# Patient Record
Sex: MALE | Race: WHITE | NOT HISPANIC OR LATINO | Employment: OTHER | URBAN - METROPOLITAN AREA
[De-identification: names, ages, dates, MRNs, and addresses within clinical notes are randomized per-mention and may not be internally consistent; named-entity substitution may affect disease eponyms.]

---

## 2018-04-12 ENCOUNTER — HOSPITAL ENCOUNTER (OUTPATIENT)
Dept: PHYSICAL THERAPY | Facility: HOSPITAL | Age: 68
Setting detail: RECURRING SERIES
Discharge: HOME | End: 2018-05-17
Attending: ORTHOPAEDIC SURGERY | Admitting: ORTHOPAEDIC SURGERY

## 2018-07-23 ENCOUNTER — ON CAMPUS - OUTPATIENT (AMBULATORY)
Dept: URBAN - METROPOLITAN AREA HOSPITAL 2 | Facility: HOSPITAL | Age: 68
End: 2018-07-23
Payer: MEDICARE

## 2018-07-23 VITALS
HEART RATE: 57 BPM | HEIGHT: 70 IN | DIASTOLIC BLOOD PRESSURE: 83 MMHG | OXYGEN SATURATION: 99 % | HEART RATE: 61 BPM | OXYGEN SATURATION: 100 % | HEART RATE: 67 BPM | DIASTOLIC BLOOD PRESSURE: 63 MMHG | HEART RATE: 68 BPM | SYSTOLIC BLOOD PRESSURE: 117 MMHG | RESPIRATION RATE: 20 BRPM | SYSTOLIC BLOOD PRESSURE: 124 MMHG | WEIGHT: 192 LBS | HEART RATE: 64 BPM | RESPIRATION RATE: 17 BRPM | HEART RATE: 62 BPM | TEMPERATURE: 98.1 F | SYSTOLIC BLOOD PRESSURE: 112 MMHG | SYSTOLIC BLOOD PRESSURE: 137 MMHG | RESPIRATION RATE: 15 BRPM | DIASTOLIC BLOOD PRESSURE: 82 MMHG | RESPIRATION RATE: 16 BRPM | DIASTOLIC BLOOD PRESSURE: 77 MMHG | DIASTOLIC BLOOD PRESSURE: 54 MMHG | OXYGEN SATURATION: 97 % | SYSTOLIC BLOOD PRESSURE: 120 MMHG | SYSTOLIC BLOOD PRESSURE: 132 MMHG | DIASTOLIC BLOOD PRESSURE: 80 MMHG | SYSTOLIC BLOOD PRESSURE: 123 MMHG | HEART RATE: 58 BPM

## 2018-07-23 DIAGNOSIS — K64.8 OTHER HEMORRHOIDS: ICD-10-CM

## 2018-07-23 DIAGNOSIS — K57.30 DIVERTICULOSIS OF LARGE INTESTINE WITHOUT PERFORATION OR ABS: ICD-10-CM

## 2018-07-23 DIAGNOSIS — Z12.11 ENCOUNTER FOR SCREENING FOR MALIGNANT NEOPLASM OF COLON: ICD-10-CM

## 2018-07-23 PROCEDURE — G0121 COLON CA SCRN NOT HI RSK IND: HCPCS | Performed by: INTERNAL MEDICINE

## 2018-07-23 RX ADMIN — PROPOFOL: 10 INJECTION, EMULSION INTRAVENOUS at 08:06

## 2019-10-21 ENCOUNTER — OFFICE VISIT (OUTPATIENT)
Dept: FAMILY MEDICINE CLINIC | Facility: CLINIC | Age: 69
End: 2019-10-21

## 2019-10-21 VITALS
WEIGHT: 191.8 LBS | HEIGHT: 71 IN | BODY MASS INDEX: 26.85 KG/M2 | HEART RATE: 65 BPM | OXYGEN SATURATION: 96 % | DIASTOLIC BLOOD PRESSURE: 80 MMHG | TEMPERATURE: 97.7 F | SYSTOLIC BLOOD PRESSURE: 133 MMHG

## 2019-10-21 DIAGNOSIS — S32.402D CLOSED NONDISPLACED FRACTURE OF LEFT ACETABULUM WITH ROUTINE HEALING, UNSPECIFIED PORTION OF ACETABULUM, SUBSEQUENT ENCOUNTER: ICD-10-CM

## 2019-10-21 DIAGNOSIS — M25.552 HIP JOINT PAINFUL ON MOVEMENT, LEFT: ICD-10-CM

## 2019-10-21 DIAGNOSIS — Z23 NEED FOR IMMUNIZATION AGAINST INFLUENZA: Primary | ICD-10-CM

## 2019-10-21 DIAGNOSIS — M25.552 HIP JOINT PAINFUL ON MOVEMENT, LEFT: Primary | ICD-10-CM

## 2019-10-21 PROBLEM — I10 ESSENTIAL HYPERTENSION: Status: ACTIVE | Noted: 2019-10-21

## 2019-10-21 PROCEDURE — 99213 OFFICE O/P EST LOW 20 MIN: CPT | Performed by: FAMILY MEDICINE

## 2019-10-21 PROCEDURE — 90653 IIV ADJUVANT VACCINE IM: CPT | Performed by: FAMILY MEDICINE

## 2019-10-21 PROCEDURE — G0008 ADMIN INFLUENZA VIRUS VAC: HCPCS | Performed by: FAMILY MEDICINE

## 2019-10-21 RX ORDER — LISINOPRIL 20 MG/1
20 TABLET ORAL DAILY
Refills: 3 | COMMUNITY
Start: 2019-10-09 | End: 2020-01-02 | Stop reason: SDUPTHER

## 2019-10-21 RX ORDER — NAPROXEN 500 MG/1
500 TABLET ORAL 2 TIMES DAILY WITH MEALS
Refills: 0 | COMMUNITY
Start: 2019-09-13 | End: 2019-11-04 | Stop reason: SDUPTHER

## 2019-10-21 NOTE — PROGRESS NOTES
Subjective   Alfonso Bull is a 69 y.o. male.   Chief Complaint   Patient presents with   • Pain       History of Present Illness   Presents to the office today with an injury to his left hip.  About 8 days ago, he stepped down into a trench that they were digging.  He felt a sudden pain in his left groin.  For about 4- 5 days he had pain on the inner aspect of his left hip that was worse with walking, and with position changes.  The pain will occasionally radiate from his left groin down to his left knee.  The pain improved dramatically with sitting.  About 5 days ago it went away for about 24 hours, then the pain recurred over the weekend.  It became so bad he was limping.  He did not hear a pop or feel a tearing pain when he stepped in the hole.  He has a known hernia, which sounds like an inguinal hernia.  He had a CAT scan done about 7 years ago at a freestanding radiology department.  No record of that is available.      Patient Active Problem List    Diagnosis Date Noted   • Essential hypertension 10/21/2019   • Hip joint painful on movement, left 10/21/2019           Past Surgical History:   Procedure Laterality Date   • COLONOSCOPY  03/01/2018   • ENDOSCOPY  01/01/2011   • TONSILLECTOMY       Current Outpatient Medications on File Prior to Visit   Medication Sig   • lisinopril (PRINIVIL,ZESTRIL) 20 MG tablet Take 20 mg by mouth Daily.   • naproxen (NAPROSYN) 500 MG tablet Take 500 mg by mouth 2 (Two) Times a Day With Meals.     No current facility-administered medications on file prior to visit.      No Known Allergies  Social History     Socioeconomic History   • Marital status:      Spouse name: Not on file   • Number of children: Not on file   • Years of education: Not on file   • Highest education level: Not on file   Tobacco Use   • Smoking status: Never Smoker   • Smokeless tobacco: Never Used     Family History   Problem Relation Age of Onset   • No Known Problems Mother    • No Known  "Problems Father    • Aneurysm Sister    • No Known Problems Brother    • No Known Problems Son    • No Known Problems Sister    • No Known Problems Son      The following portions of the patient's history were reviewed and updated as appropriate: allergies, current medications, past family history, past medical history, past social history, past surgical history and problem list.    Review of Systems   Constitutional: Negative for chills and fever.   Respiratory: Negative for shortness of breath.    Cardiovascular: Negative for chest pain.   Gastrointestinal: Negative for abdominal pain.       Objective   /80 (BP Location: Right arm, Patient Position: Sitting, Cuff Size: Adult)   Pulse 65   Temp 97.7 °F (36.5 °C) (Oral)   Ht 180.3 cm (71\")   Wt 87 kg (191 lb 12.8 oz)   SpO2 96%   BMI 26.75 kg/m²   Physical Exam   Constitutional: He is oriented to person, place, and time. He appears well-developed and well-nourished.   HENT:   Head: Normocephalic and atraumatic.   Eyes: Conjunctivae and EOM are normal.   Cardiovascular: Normal rate.   Pulmonary/Chest: Effort normal.   Abdominal: Hernia confirmed negative in the right inguinal area and confirmed negative in the left inguinal area.   Musculoskeletal: Normal range of motion. He exhibits no edema.   Sudden sharp pain to passive internal rotation of the left hip.  No tenderness palpation across the lower lumbar spine.  Flip test is equivocal, to slightly positive on the left.   Lymphadenopathy: No inguinal adenopathy noted on the right or left side.   Neurological: He is alert and oriented to person, place, and time.         No visits with results within 4 Month(s) from this visit.   Latest known visit with results is:   No results found for any previous visit.         Assessment/Plan   Diagnoses and all orders for this visit:    1. Need for immunization against influenza (Primary)  -     Fluad Quad >65 years    2. Hip joint painful on movement, left  -     XR " Hip With or Without Pelvis 2 - 3 View Left  -     XR Spine Lumbar 4+ View (In Office)    The differential diagnosis includes a possible fracture of the hip joint, pubic ramus, possibly a compression fracture of the lumbar spine with radiated pain.  Possibly a tendinous, or ligamentous injury, and much less likely is a tear to his hernia.  We will start with an x-ray as above.  We will treat with anti-inflammatories for another week to week and a half.  If he continues to have pain, he will let me know and we will order an MRI of his hip.  We will follow-up with the results of the x-rays are available.             Return if symptoms worsen or fail to improve.

## 2019-10-22 ENCOUNTER — TELEPHONE (OUTPATIENT)
Dept: FAMILY MEDICINE CLINIC | Facility: CLINIC | Age: 69
End: 2019-10-22

## 2019-10-22 DIAGNOSIS — S32.402D CLOSED NONDISPLACED FRACTURE OF LEFT ACETABULUM WITH ROUTINE HEALING, UNSPECIFIED PORTION OF ACETABULUM, SUBSEQUENT ENCOUNTER: Primary | ICD-10-CM

## 2019-10-22 DIAGNOSIS — M25.552 HIP JOINT PAINFUL ON MOVEMENT, LEFT: ICD-10-CM

## 2019-10-22 PROBLEM — S32.425D: Status: ACTIVE | Noted: 2019-10-22

## 2019-10-22 RX ORDER — HYDROCODONE BITARTRATE AND ACETAMINOPHEN 5; 325 MG/1; MG/1
1 TABLET ORAL EVERY 4 HOURS PRN
Qty: 24 TABLET | Refills: 0 | Status: SHIPPED | OUTPATIENT
Start: 2019-10-22 | End: 2020-10-26

## 2019-10-22 NOTE — TELEPHONE ENCOUNTER
Pt calling wanting to know if he can have something sent in for pain to [facility] pharmacy if appropriate for his hip fracture. Thanks

## 2019-11-04 RX ORDER — NAPROXEN 500 MG/1
500 TABLET ORAL 2 TIMES DAILY WITH MEALS
Qty: 180 TABLET | Refills: 1 | Status: SHIPPED | OUTPATIENT
Start: 2019-11-04 | End: 2020-01-03 | Stop reason: SDUPTHER

## 2019-11-04 NOTE — TELEPHONE ENCOUNTER
Pt would like a 90 day supply of his naproxen sent to [facility] please. Last ov 10/21/19

## 2020-01-02 RX ORDER — LISINOPRIL 20 MG/1
20 TABLET ORAL DAILY
Qty: 90 TABLET | Refills: 3 | Status: SHIPPED | OUTPATIENT
Start: 2020-01-02 | End: 2020-03-24 | Stop reason: DRUGHIGH

## 2020-01-03 DIAGNOSIS — S32.425D CLOSED NONDISPLACED FRACTURE OF POSTERIOR WALL OF LEFT ACETABULUM WITH ROUTINE HEALING: Primary | ICD-10-CM

## 2020-01-03 RX ORDER — NAPROXEN 500 MG/1
500 TABLET ORAL 2 TIMES DAILY WITH MEALS
Qty: 180 TABLET | Refills: 1 | Status: SHIPPED | OUTPATIENT
Start: 2020-01-03 | End: 2020-06-12

## 2020-01-03 NOTE — TELEPHONE ENCOUNTER
Patient is needing a refill of his Naproxen to [facility]. Medication is loaded and ready to send. Thanks.

## 2020-01-31 DIAGNOSIS — S32.425D CLOSED NONDISPLACED FRACTURE OF POSTERIOR WALL OF LEFT ACETABULUM WITH ROUTINE HEALING: ICD-10-CM

## 2020-01-31 RX ORDER — NAPROXEN 500 MG/1
500 TABLET ORAL 2 TIMES DAILY WITH MEALS
Qty: 180 TABLET | Refills: 1 | OUTPATIENT
Start: 2020-01-31

## 2020-03-07 DIAGNOSIS — S32.425D CLOSED NONDISPLACED FRACTURE OF POSTERIOR WALL OF LEFT ACETABULUM WITH ROUTINE HEALING: ICD-10-CM

## 2020-03-08 RX ORDER — NAPROXEN 500 MG/1
500 TABLET ORAL 2 TIMES DAILY WITH MEALS
Qty: 180 TABLET | Refills: 1 | OUTPATIENT
Start: 2020-03-08

## 2020-03-12 ENCOUNTER — CLINICAL SUPPORT (OUTPATIENT)
Dept: FAMILY MEDICINE CLINIC | Facility: CLINIC | Age: 70
End: 2020-03-12

## 2020-03-12 VITALS — HEART RATE: 60 BPM | DIASTOLIC BLOOD PRESSURE: 85 MMHG | OXYGEN SATURATION: 98 % | SYSTOLIC BLOOD PRESSURE: 159 MMHG

## 2020-03-12 NOTE — PROGRESS NOTES
"Patient came in for a blood pressure check.  Patient took his bp medication this morning around 0730.   Pt states he took his bp at home last night and it was 167/87 at home and it was running around that this morning around 7 before he took his medication.  Pt states that off and on x a year he occasionally has episodes that he feels \"weird\" or has a light feeling and wonder if it isn't because his blood pressure is off.    "

## 2020-03-13 NOTE — PROGRESS NOTES
Please ask Alfonso to begin checking his blood pressure once daily at home.  If his systolic blood pressure remains above 140, let me know.  Thanks.

## 2020-03-24 ENCOUNTER — TELEPHONE (OUTPATIENT)
Dept: FAMILY MEDICINE CLINIC | Facility: CLINIC | Age: 70
End: 2020-03-24

## 2020-03-24 DIAGNOSIS — I10 ESSENTIAL HYPERTENSION: Primary | ICD-10-CM

## 2020-03-24 RX ORDER — LISINOPRIL 30 MG/1
1 TABLET ORAL DAILY
COMMUNITY
End: 2020-03-24 | Stop reason: SDUPTHER

## 2020-03-24 RX ORDER — LISINOPRIL 30 MG/1
30 TABLET ORAL DAILY
Qty: 90 TABLET | Refills: 1 | Status: SHIPPED | OUTPATIENT
Start: 2020-03-24 | End: 2020-09-21 | Stop reason: SDUPTHER

## 2020-03-24 NOTE — TELEPHONE ENCOUNTER
----- Message from Winter Hall MD sent at 3/23/2020 12:55 PM EDT -----      ----- Message -----  From: Tracy Bull  Sent: 3/23/2020   9:24 AM EDT  To: Winter Hall MD

## 2020-04-06 ENCOUNTER — TELEPHONE (OUTPATIENT)
Dept: FAMILY MEDICINE CLINIC | Facility: CLINIC | Age: 70
End: 2020-04-06

## 2020-04-06 DIAGNOSIS — I10 ESSENTIAL HYPERTENSION: Primary | ICD-10-CM

## 2020-04-06 RX ORDER — AMLODIPINE BESYLATE 10 MG/1
10 TABLET ORAL DAILY
Qty: 30 TABLET | Refills: 3 | Status: SHIPPED | OUTPATIENT
Start: 2020-04-06 | End: 2020-10-26 | Stop reason: SDUPTHER

## 2020-04-06 NOTE — TELEPHONE ENCOUNTER
That is still a little too high.  I would like to add amlodipine to his daily blood pressure regimen.  [facility]  Keep checking his blood pressure as he has been doing and let us know how his blood pressure looks in about a week.  If he would like, we could do a video or telephone visit at that time.  Thanks

## 2020-04-06 NOTE — TELEPHONE ENCOUNTER
Pts bp medication was recently increased to lisinopril 30 on march 24. Pt has been taking bp and concerned it is still running high.   3-29 142/74   7pm   3-30   157/72    7pm  3/31 145/78   7:22pm  4/1    143/78    9pm  4/2    137/78    8pm  4/4   150/80   620pm    I checked Alfonso's bp machine along with a manual cuff on Saturday. Pts bp machine on 4/4 was 157/84 and with my manual it was 150/80

## 2020-06-12 DIAGNOSIS — S32.425D CLOSED NONDISPLACED FRACTURE OF POSTERIOR WALL OF LEFT ACETABULUM WITH ROUTINE HEALING: ICD-10-CM

## 2020-06-12 RX ORDER — NAPROXEN 500 MG/1
500 TABLET ORAL 2 TIMES DAILY WITH MEALS
Qty: 180 TABLET | Refills: 1 | Status: SHIPPED | OUTPATIENT
Start: 2020-06-12 | End: 2020-10-15 | Stop reason: SDUPTHER

## 2020-09-21 DIAGNOSIS — I10 ESSENTIAL HYPERTENSION: ICD-10-CM

## 2020-09-21 RX ORDER — LISINOPRIL 30 MG/1
30 TABLET ORAL DAILY
Qty: 90 TABLET | Refills: 1 | Status: SHIPPED | OUTPATIENT
Start: 2020-09-21 | End: 2020-10-26 | Stop reason: SDUPTHER

## 2020-09-21 NOTE — TELEPHONE ENCOUNTER
Please let Alfonso know that I got a refill request for his blood pressure medication.  I have refilled that.  I see that our follow-up appointment in the spring had to be canceled because of the pandemic.  Please ask him to schedule a follow-up on his blood pressure within the next couple of months.  Thanks

## 2020-10-15 DIAGNOSIS — S32.425D CLOSED NONDISPLACED FRACTURE OF POSTERIOR WALL OF LEFT ACETABULUM WITH ROUTINE HEALING: ICD-10-CM

## 2020-10-16 RX ORDER — NAPROXEN 500 MG/1
500 TABLET ORAL 2 TIMES DAILY WITH MEALS
Qty: 180 TABLET | Refills: 1 | Status: SHIPPED | OUTPATIENT
Start: 2020-10-16 | End: 2020-10-26 | Stop reason: SDUPTHER

## 2020-10-26 ENCOUNTER — OFFICE VISIT (OUTPATIENT)
Dept: FAMILY MEDICINE CLINIC | Facility: CLINIC | Age: 70
End: 2020-10-26

## 2020-10-26 VITALS
SYSTOLIC BLOOD PRESSURE: 121 MMHG | TEMPERATURE: 97 F | DIASTOLIC BLOOD PRESSURE: 73 MMHG | BODY MASS INDEX: 27.54 KG/M2 | HEIGHT: 70 IN | WEIGHT: 192.4 LBS | OXYGEN SATURATION: 97 % | HEART RATE: 56 BPM

## 2020-10-26 DIAGNOSIS — S32.425D CLOSED NONDISPLACED FRACTURE OF POSTERIOR WALL OF LEFT ACETABULUM WITH ROUTINE HEALING: ICD-10-CM

## 2020-10-26 DIAGNOSIS — Z11.59 NEED FOR HEPATITIS C SCREENING TEST: ICD-10-CM

## 2020-10-26 DIAGNOSIS — Z23 NEED FOR INFLUENZA VACCINATION: ICD-10-CM

## 2020-10-26 DIAGNOSIS — Z12.5 ENCOUNTER FOR PROSTATE CANCER SCREENING: ICD-10-CM

## 2020-10-26 DIAGNOSIS — Z23 NEED FOR 23-POLYVALENT PNEUMOCOCCAL POLYSACCHARIDE VACCINE: ICD-10-CM

## 2020-10-26 DIAGNOSIS — Z13.220 SCREENING FOR CHOLESTEROL LEVEL: ICD-10-CM

## 2020-10-26 DIAGNOSIS — I10 ESSENTIAL HYPERTENSION: Primary | ICD-10-CM

## 2020-10-26 DIAGNOSIS — Z23 FLU VACCINE NEED: ICD-10-CM

## 2020-10-26 PROCEDURE — 99214 OFFICE O/P EST MOD 30 MIN: CPT | Performed by: FAMILY MEDICINE

## 2020-10-26 PROCEDURE — 90732 PPSV23 VACC 2 YRS+ SUBQ/IM: CPT | Performed by: FAMILY MEDICINE

## 2020-10-26 PROCEDURE — G0008 ADMIN INFLUENZA VIRUS VAC: HCPCS | Performed by: FAMILY MEDICINE

## 2020-10-26 PROCEDURE — G0009 ADMIN PNEUMOCOCCAL VACCINE: HCPCS | Performed by: FAMILY MEDICINE

## 2020-10-26 PROCEDURE — 90694 VACC AIIV4 NO PRSRV 0.5ML IM: CPT | Performed by: FAMILY MEDICINE

## 2020-10-26 RX ORDER — NAPROXEN 500 MG/1
500 TABLET ORAL 2 TIMES DAILY WITH MEALS
Qty: 180 TABLET | Refills: 3 | Status: SHIPPED | OUTPATIENT
Start: 2020-10-26 | End: 2021-11-29

## 2020-10-26 RX ORDER — AMLODIPINE BESYLATE 10 MG/1
10 TABLET ORAL DAILY
Qty: 90 TABLET | Refills: 3 | Status: SHIPPED | OUTPATIENT
Start: 2020-10-26 | End: 2022-03-16 | Stop reason: SDUPTHER

## 2020-10-26 RX ORDER — LISINOPRIL 30 MG/1
30 TABLET ORAL DAILY
Qty: 90 TABLET | Refills: 3 | Status: SHIPPED | OUTPATIENT
Start: 2020-10-26 | End: 2021-09-13

## 2020-10-26 NOTE — PROGRESS NOTES
Subjective   Alfonso Bull is a 70 y.o. male.   Chief Complaint   Patient presents with   • Hypertension       History of Present Illness   Presents to the office today to follow-up on hypertension.  Continues on amlodipine and Prinivil.  No side effects of medications.  Blood pressure control remains good.  He is interested in a Pneumovax and influenza vaccine today.  Last labs according to the chart was 2012.  Last fall, he had a fall with injury of his left hip.  There was suspected acetabular fracture - this was not confirmed on MRI.  No longer has any hip pain.  The orthopedic specialist felt that a lot of the pain may have been coming from his back.  His back is not hurting him now, either.      Patient Active Problem List    Diagnosis Date Noted   • Closed nondisplaced fracture of posterior wall of left acetabulum with routine healing 10/22/2019     Note Last Updated: 10/22/2019     DX on 10/21/19     • Essential hypertension 10/21/2019           Past Surgical History:   Procedure Laterality Date   • COLONOSCOPY  03/01/2018   • ENDOSCOPY  01/01/2011   • TONSILLECTOMY       Current Outpatient Medications on File Prior to Visit   Medication Sig   • [DISCONTINUED] amLODIPine (NORVASC) 10 MG tablet Take 1 tablet by mouth Daily.   • [DISCONTINUED] lisinopril (PRINIVIL,ZESTRIL) 30 MG tablet Take 1 tablet by mouth Daily.   • [DISCONTINUED] naproxen (NAPROSYN) 500 MG tablet Take 1 tablet by mouth 2 (Two) Times a Day With Meals.   • [DISCONTINUED] HYDROcodone-acetaminophen (NORCO) 5-325 MG per tablet Take 1 tablet by mouth Every 4 (Four) Hours As Needed for Severe Pain .     No current facility-administered medications on file prior to visit.      No Known Allergies  Social History     Socioeconomic History   • Marital status:      Spouse name: Not on file   • Number of children: Not on file   • Years of education: Not on file   • Highest education level: Not on file   Tobacco Use   • Smoking status: Never  "Smoker   • Smokeless tobacco: Never Used     Family History   Problem Relation Age of Onset   • No Known Problems Mother    • No Known Problems Father    • Aneurysm Sister    • No Known Problems Brother    • No Known Problems Son    • No Known Problems Sister    • No Known Problems Son      The following portions of the patient's history were reviewed and updated as appropriate: allergies, current medications, past family history, past medical history, past social history, past surgical history and problem list.    Review of Systems   Constitutional: Negative for chills and fever.   Respiratory: Negative for shortness of breath.    Cardiovascular: Negative for chest pain.   Gastrointestinal: Negative for abdominal pain.       Objective   /73 (BP Location: Right arm, Patient Position: Sitting, Cuff Size: Adult)   Pulse 56   Temp 97 °F (36.1 °C) (Infrared)   Ht 177.8 cm (70\")   Wt 87.3 kg (192 lb 6.4 oz)   SpO2 97%   BMI 27.61 kg/m²   Physical Exam  Constitutional:       General: He is not in acute distress.     Appearance: Normal appearance. He is well-developed and normal weight.      Comments: Wearing a face mask     HENT:      Head: Normocephalic and atraumatic.   Eyes:      Conjunctiva/sclera: Conjunctivae normal.   Neck:      Musculoskeletal: Normal range of motion.   Cardiovascular:      Rate and Rhythm: Normal rate and regular rhythm.      Heart sounds: Normal heart sounds. No murmur.   Pulmonary:      Effort: Pulmonary effort is normal. No respiratory distress.      Breath sounds: Normal breath sounds.   Musculoskeletal: Normal range of motion.   Skin:     General: Skin is warm and dry.      Findings: No rash.   Neurological:      Mental Status: He is alert and oriented to person, place, and time.   Psychiatric:         Behavior: Behavior normal.           No visits with results within 4 Month(s) from this visit.   Latest known visit with results is:   No results found for any previous visit. "         Assessment/Plan   Diagnoses and all orders for this visit:    1. Essential hypertension (Primary)  -     Comprehensive Metabolic Panel  -     CBC & Differential  -     amLODIPine (NORVASC) 10 MG tablet; Take 1 tablet by mouth Daily.  Dispense: 90 tablet; Refill: 3  -     lisinopril (PRINIVIL,ZESTRIL) 30 MG tablet; Take 1 tablet by mouth Daily.  Dispense: 90 tablet; Refill: 3    2. Encounter for prostate cancer screening  -     PSA Screen    3. Need for 23-polyvalent pneumococcal polysaccharide vaccine    4. Flu vaccine need    5. Screening for cholesterol level  -     Lipid Panel    6. Closed nondisplaced fracture of posterior wall of left acetabulum with routine healing  -     naproxen (NAPROSYN) 500 MG tablet; Take 1 tablet by mouth 2 (Two) Times a Day With Meals.  Dispense: 180 tablet; Refill: 3    7. Need for hepatitis C screening test  -     Hepatitis C Antibody    Refill all medications at current doses.  Get labs as above.  Screen for prostate cancer, high cholesterol, diabetes.  We will do the one-time Medicare recommended hepatitis C antibody screen.  Flu shot and pneumonia shot today.  Colonoscopy every 10 years.  Follow-up annually.  Let me know if I can help him before next visit.             Return in about 1 year (around 10/26/2021).    Call with any problems or concerns before next visit

## 2020-10-27 LAB
ALBUMIN SERPL-MCNC: 4.6 G/DL (ref 3.8–4.8)
ALBUMIN/GLOB SERPL: 1.5 {RATIO} (ref 1.2–2.2)
ALP SERPL-CCNC: 100 IU/L (ref 39–117)
ALT SERPL-CCNC: 14 IU/L (ref 0–44)
AST SERPL-CCNC: 15 IU/L (ref 0–40)
BASOPHILS # BLD AUTO: 0.1 X10E3/UL (ref 0–0.2)
BASOPHILS NFR BLD AUTO: 1 %
BILIRUB SERPL-MCNC: 0.5 MG/DL (ref 0–1.2)
BUN SERPL-MCNC: 24 MG/DL (ref 8–27)
BUN/CREAT SERPL: 22 (ref 10–24)
CALCIUM SERPL-MCNC: 9.6 MG/DL (ref 8.6–10.2)
CHLORIDE SERPL-SCNC: 103 MMOL/L (ref 96–106)
CHOLEST SERPL-MCNC: 174 MG/DL (ref 100–199)
CO2 SERPL-SCNC: 27 MMOL/L (ref 20–29)
CREAT SERPL-MCNC: 1.1 MG/DL (ref 0.76–1.27)
EOSINOPHIL # BLD AUTO: 0.2 X10E3/UL (ref 0–0.4)
EOSINOPHIL NFR BLD AUTO: 2 %
ERYTHROCYTE [DISTWIDTH] IN BLOOD BY AUTOMATED COUNT: 12.7 % (ref 11.6–15.4)
GLOBULIN SER CALC-MCNC: 3 G/DL (ref 1.5–4.5)
GLUCOSE SERPL-MCNC: 95 MG/DL (ref 65–99)
HCT VFR BLD AUTO: 46.4 % (ref 37.5–51)
HCV AB S/CO SERPL IA: <0.1 S/CO RATIO (ref 0–0.9)
HDLC SERPL-MCNC: 44 MG/DL
HGB BLD-MCNC: 15.5 G/DL (ref 13–17.7)
IMM GRANULOCYTES # BLD AUTO: 0 X10E3/UL (ref 0–0.1)
IMM GRANULOCYTES NFR BLD AUTO: 0 %
LDLC SERPL CALC-MCNC: 117 MG/DL (ref 0–99)
LYMPHOCYTES # BLD AUTO: 1.8 X10E3/UL (ref 0.7–3.1)
LYMPHOCYTES NFR BLD AUTO: 24 %
MCH RBC QN AUTO: 30.2 PG (ref 26.6–33)
MCHC RBC AUTO-ENTMCNC: 33.4 G/DL (ref 31.5–35.7)
MCV RBC AUTO: 90 FL (ref 79–97)
MONOCYTES # BLD AUTO: 0.8 X10E3/UL (ref 0.1–0.9)
MONOCYTES NFR BLD AUTO: 10 %
NEUTROPHILS # BLD AUTO: 4.7 X10E3/UL (ref 1.4–7)
NEUTROPHILS NFR BLD AUTO: 63 %
PLATELET # BLD AUTO: 193 X10E3/UL (ref 150–450)
POTASSIUM SERPL-SCNC: 5.1 MMOL/L (ref 3.5–5.2)
PROT SERPL-MCNC: 7.6 G/DL (ref 6–8.5)
PSA SERPL-MCNC: 2.9 NG/ML (ref 0–4)
RBC # BLD AUTO: 5.13 X10E6/UL (ref 4.14–5.8)
SODIUM SERPL-SCNC: 141 MMOL/L (ref 134–144)
TRIGL SERPL-MCNC: 69 MG/DL (ref 0–149)
VLDLC SERPL CALC-MCNC: 13 MG/DL (ref 5–40)
WBC # BLD AUTO: 7.5 X10E3/UL (ref 3.4–10.8)

## 2020-10-29 DIAGNOSIS — E78.2 MIXED HYPERLIPIDEMIA: Primary | ICD-10-CM

## 2020-10-29 RX ORDER — ATORVASTATIN CALCIUM 20 MG/1
20 TABLET, FILM COATED ORAL DAILY
Qty: 90 TABLET | Refills: 1 | Status: SHIPPED | OUTPATIENT
Start: 2020-10-29 | End: 2021-07-07 | Stop reason: SDUPTHER

## 2021-03-13 LAB
ALBUMIN SERPL-MCNC: 4.1 G/DL (ref 3.8–4.8)
ALBUMIN/GLOB SERPL: 1.4 {RATIO} (ref 1.2–2.2)
ALP SERPL-CCNC: 98 IU/L (ref 39–117)
ALT SERPL-CCNC: 18 IU/L (ref 0–44)
AST SERPL-CCNC: 20 IU/L (ref 0–40)
BILIRUB SERPL-MCNC: 1 MG/DL (ref 0–1.2)
BUN SERPL-MCNC: 25 MG/DL (ref 8–27)
BUN/CREAT SERPL: 23 (ref 10–24)
CALCIUM SERPL-MCNC: 9.1 MG/DL (ref 8.6–10.2)
CHLORIDE SERPL-SCNC: 105 MMOL/L (ref 96–106)
CHOLEST SERPL-MCNC: 100 MG/DL (ref 100–199)
CO2 SERPL-SCNC: 23 MMOL/L (ref 20–29)
CREAT SERPL-MCNC: 1.09 MG/DL (ref 0.76–1.27)
GLOBULIN SER CALC-MCNC: 3 G/DL (ref 1.5–4.5)
GLUCOSE SERPL-MCNC: 99 MG/DL (ref 65–99)
HDLC SERPL-MCNC: 39 MG/DL
LDLC SERPL CALC-MCNC: 48 MG/DL (ref 0–99)
POTASSIUM SERPL-SCNC: 4.4 MMOL/L (ref 3.5–5.2)
PROT SERPL-MCNC: 7.1 G/DL (ref 6–8.5)
SODIUM SERPL-SCNC: 140 MMOL/L (ref 134–144)
TRIGL SERPL-MCNC: 58 MG/DL (ref 0–149)
VLDLC SERPL CALC-MCNC: 13 MG/DL (ref 5–40)

## 2021-07-07 DIAGNOSIS — E78.2 MIXED HYPERLIPIDEMIA: ICD-10-CM

## 2021-07-07 RX ORDER — ATORVASTATIN CALCIUM 20 MG/1
20 TABLET, FILM COATED ORAL DAILY
Qty: 90 TABLET | Refills: 1 | Status: SHIPPED | OUTPATIENT
Start: 2021-07-07 | End: 2021-10-04

## 2021-09-13 DIAGNOSIS — I10 ESSENTIAL HYPERTENSION: ICD-10-CM

## 2021-09-13 RX ORDER — LISINOPRIL 30 MG/1
30 TABLET ORAL DAILY
Qty: 90 TABLET | Refills: 3 | Status: SHIPPED | OUTPATIENT
Start: 2021-09-13 | End: 2021-12-13 | Stop reason: SDUPTHER

## 2021-10-04 DIAGNOSIS — E78.2 MIXED HYPERLIPIDEMIA: ICD-10-CM

## 2021-10-04 RX ORDER — ATORVASTATIN CALCIUM 20 MG/1
TABLET, FILM COATED ORAL
Qty: 90 TABLET | Refills: 1 | Status: SHIPPED | OUTPATIENT
Start: 2021-10-04 | End: 2022-10-26 | Stop reason: SDUPTHER

## 2021-10-26 ENCOUNTER — OFFICE VISIT (OUTPATIENT)
Dept: FAMILY MEDICINE CLINIC | Facility: CLINIC | Age: 71
End: 2021-10-26

## 2021-10-26 VITALS
WEIGHT: 201 LBS | OXYGEN SATURATION: 96 % | SYSTOLIC BLOOD PRESSURE: 124 MMHG | HEIGHT: 70 IN | BODY MASS INDEX: 28.77 KG/M2 | TEMPERATURE: 98.1 F | DIASTOLIC BLOOD PRESSURE: 64 MMHG | HEART RATE: 64 BPM

## 2021-10-26 DIAGNOSIS — E78.2 MIXED HYPERLIPIDEMIA: ICD-10-CM

## 2021-10-26 DIAGNOSIS — I10 ESSENTIAL HYPERTENSION: Primary | ICD-10-CM

## 2021-10-26 DIAGNOSIS — Z12.5 ENCOUNTER FOR PROSTATE CANCER SCREENING: ICD-10-CM

## 2021-10-26 PROCEDURE — 99214 OFFICE O/P EST MOD 30 MIN: CPT | Performed by: FAMILY MEDICINE

## 2021-10-26 NOTE — PROGRESS NOTES
Subjective   Alfonso Bull is a 71 y.o. male.   Chief Complaint   Patient presents with   • Hypertension       History of Present Illness   Presents to the office today for follow-up on hypertension and hyperlipidemia.  Tolerating lisinopril, Lipitor, amlodipine without side effects such as swelling, headaches, cough.  Blood pressure control remains good.  Does not even check his blood pressure often since it typically is good.  No myalgias from Lipitor.  Last year his lipid panel was elevated.  We started him on Lipitor and recheck his lipids in March.  They were down and significantly improved.    He had a colonoscopy in 2018.  Due for recheck in 2028.  No known heart disease, no history of stroke.  No kidney or liver disease.    No other complaints today.        Patient Active Problem List    Diagnosis Date Noted   • Mixed hyperlipidemia 10/26/2021   • Closed nondisplaced fracture of posterior wall of left acetabulum with routine healing 10/22/2019     Note Last Updated: 10/22/2019     DX on 10/21/19     • Essential hypertension 10/21/2019           Past Surgical History:   Procedure Laterality Date   • COLONOSCOPY  03/01/2018   • ENDOSCOPY  01/01/2011   • TONSILLECTOMY       Current Outpatient Medications on File Prior to Visit   Medication Sig   • amLODIPine (NORVASC) 10 MG tablet Take 1 tablet by mouth Daily.   • atorvastatin (LIPITOR) 20 MG tablet TAKE ONE TABLET BY MOUTH EVERY DAY   • lisinopril (PRINIVIL,ZESTRIL) 30 MG tablet Take 1 tablet by mouth Daily.   • naproxen (NAPROSYN) 500 MG tablet Take 1 tablet by mouth 2 (Two) Times a Day With Meals.     No current facility-administered medications on file prior to visit.     No Known Allergies  Social History     Socioeconomic History   • Marital status:    Tobacco Use   • Smoking status: Never Smoker   • Smokeless tobacco: Never Used     Family History   Problem Relation Age of Onset   • No Known Problems Mother    • No Known Problems Father    •  "Aneurysm Sister    • No Known Problems Brother    • No Known Problems Son    • No Known Problems Sister    • No Known Problems Son        Review of Systems    Objective   /64 (BP Location: Left arm, Patient Position: Sitting, Cuff Size: Adult)   Pulse 64   Temp 98.1 °F (36.7 °C) (Oral)   Ht 177.8 cm (70\")   Wt 91.2 kg (201 lb)   SpO2 96%   BMI 28.84 kg/m²   Physical Exam  Constitutional:       Appearance: He is well-developed.      Comments: Wearing a face mask     HENT:      Head: Normocephalic and atraumatic.   Eyes:      Conjunctiva/sclera: Conjunctivae normal.   Cardiovascular:      Rate and Rhythm: Normal rate and regular rhythm.      Heart sounds: No murmur heard.      Pulmonary:      Effort: Pulmonary effort is normal. No respiratory distress.      Breath sounds: Normal breath sounds.   Musculoskeletal:         General: Normal range of motion.      Cervical back: Normal range of motion.   Skin:     General: Skin is warm and dry.      Findings: No rash.   Neurological:      Mental Status: He is alert and oriented to person, place, and time.   Psychiatric:         Behavior: Behavior normal.           No visits with results within 4 Month(s) from this visit.   Latest known visit with results is:   Orders Only on 03/12/2021   Component Date Value Ref Range Status   • Glucose 03/12/2021 99  65 - 99 mg/dL Final   • BUN 03/12/2021 25  8 - 27 mg/dL Final   • Creatinine 03/12/2021 1.09  0.76 - 1.27 mg/dL Final   • eGFR Non  Am 03/12/2021 69  >59 mL/min/1.73 Final   • eGFR African Am 03/12/2021 80  >59 mL/min/1.73 Final   • BUN/Creatinine Ratio 03/12/2021 23  10 - 24 Final   • Sodium 03/12/2021 140  134 - 144 mmol/L Final   • Potassium 03/12/2021 4.4  3.5 - 5.2 mmol/L Final   • Chloride 03/12/2021 105  96 - 106 mmol/L Final   • Total CO2 03/12/2021 23  20 - 29 mmol/L Final   • Calcium 03/12/2021 9.1  8.6 - 10.2 mg/dL Final   • Total Protein 03/12/2021 7.1  6.0 - 8.5 g/dL Final   • Albumin 03/12/2021 " 4.1  3.8 - 4.8 g/dL Final   • Globulin 03/12/2021 3.0  1.5 - 4.5 g/dL Final   • A/G Ratio 03/12/2021 1.4  1.2 - 2.2 Final   • Total Bilirubin 03/12/2021 1.0  0.0 - 1.2 mg/dL Final   • Alkaline Phosphatase 03/12/2021 98  39 - 117 IU/L Final   • AST (SGOT) 03/12/2021 20  0 - 40 IU/L Final   • ALT (SGPT) 03/12/2021 18  0 - 44 IU/L Final   • Total Cholesterol 03/12/2021 100  100 - 199 mg/dL Final   • Triglycerides 03/12/2021 58  0 - 149 mg/dL Final   • HDL Cholesterol 03/12/2021 39* >39 mg/dL Final   • VLDL Cholesterol Chris 03/12/2021 13  5 - 40 mg/dL Final   • LDL Chol Calc (NIH) 03/12/2021 48  0 - 99 mg/dL Final         Assessment/Plan   Diagnoses and all orders for this visit:    1. Essential hypertension (Primary)  -     Comprehensive Metabolic Panel  -     CBC & Differential    2. Mixed hyperlipidemia  -     Lipid Panel    3. Encounter for prostate cancer screening  -     PSA Screen    Exam is essentially unremarkable.  He is otherwise asymptomatic.  Blood pressure is treated to goal.  Tolerating lisinopril and amlodipine without side effects.  Continue the current medications at current doses.  Recheck lipid panel today to monitor status of cholesterol.  PSA today to screen for prostate cancer.  We discussed vaccines.  We are currently out of flu shots.  We will get him one as soon as we get restocked.  We discussed the shingles and tetanus vaccines.  He is going to look into getting those at the pharmacy for insurance coverage will be better.  We will follow-up with him as soon as results of his labs are back.           Call with any problems or concerns before next visit  Return in about 1 year (around 10/26/2022).      Much of this report is an electronic transcription of spoken language to printed text using Dragon dictation software.  As such, the subtleties and finesse of spoken language may permit erroneous, or at times, nonsensical words or phrases to be inadvertently transcribed; thus changes may be made  at a later date to rectify these errors.

## 2021-10-27 LAB
ALBUMIN SERPL-MCNC: 4.2 G/DL (ref 3.8–4.8)
ALBUMIN/GLOB SERPL: 1.6 {RATIO} (ref 1.2–2.2)
ALP SERPL-CCNC: 97 IU/L (ref 44–121)
ALT SERPL-CCNC: 15 IU/L (ref 0–44)
AST SERPL-CCNC: 15 IU/L (ref 0–40)
BASOPHILS # BLD AUTO: 0.1 X10E3/UL (ref 0–0.2)
BASOPHILS NFR BLD AUTO: 1 %
BILIRUB SERPL-MCNC: 1 MG/DL (ref 0–1.2)
BUN SERPL-MCNC: 22 MG/DL (ref 8–27)
BUN/CREAT SERPL: 19 (ref 10–24)
CALCIUM SERPL-MCNC: 9.3 MG/DL (ref 8.6–10.2)
CHLORIDE SERPL-SCNC: 104 MMOL/L (ref 96–106)
CHOLEST SERPL-MCNC: 115 MG/DL (ref 100–199)
CO2 SERPL-SCNC: 25 MMOL/L (ref 20–29)
CREAT SERPL-MCNC: 1.14 MG/DL (ref 0.76–1.27)
EOSINOPHIL # BLD AUTO: 0.2 X10E3/UL (ref 0–0.4)
EOSINOPHIL NFR BLD AUTO: 2 %
ERYTHROCYTE [DISTWIDTH] IN BLOOD BY AUTOMATED COUNT: 13.1 % (ref 11.6–15.4)
GLOBULIN SER CALC-MCNC: 2.7 G/DL (ref 1.5–4.5)
GLUCOSE SERPL-MCNC: 97 MG/DL (ref 65–99)
HCT VFR BLD AUTO: 43.2 % (ref 37.5–51)
HDLC SERPL-MCNC: 40 MG/DL
HGB BLD-MCNC: 14.4 G/DL (ref 13–17.7)
IMM GRANULOCYTES # BLD AUTO: 0 X10E3/UL (ref 0–0.1)
IMM GRANULOCYTES NFR BLD AUTO: 0 %
LDLC SERPL CALC-MCNC: 61 MG/DL (ref 0–99)
LYMPHOCYTES # BLD AUTO: 1.7 X10E3/UL (ref 0.7–3.1)
LYMPHOCYTES NFR BLD AUTO: 25 %
MCH RBC QN AUTO: 29.9 PG (ref 26.6–33)
MCHC RBC AUTO-ENTMCNC: 33.3 G/DL (ref 31.5–35.7)
MCV RBC AUTO: 90 FL (ref 79–97)
MONOCYTES # BLD AUTO: 0.7 X10E3/UL (ref 0.1–0.9)
MONOCYTES NFR BLD AUTO: 10 %
NEUTROPHILS # BLD AUTO: 4.3 X10E3/UL (ref 1.4–7)
NEUTROPHILS NFR BLD AUTO: 62 %
PLATELET # BLD AUTO: 176 X10E3/UL (ref 150–450)
POTASSIUM SERPL-SCNC: 4.6 MMOL/L (ref 3.5–5.2)
PROT SERPL-MCNC: 6.9 G/DL (ref 6–8.5)
PSA SERPL-MCNC: 2.1 NG/ML (ref 0–4)
RBC # BLD AUTO: 4.81 X10E6/UL (ref 4.14–5.8)
SODIUM SERPL-SCNC: 139 MMOL/L (ref 134–144)
TRIGL SERPL-MCNC: 64 MG/DL (ref 0–149)
VLDLC SERPL CALC-MCNC: 14 MG/DL (ref 5–40)
WBC # BLD AUTO: 6.9 X10E3/UL (ref 3.4–10.8)

## 2021-10-29 ENCOUNTER — TELEPHONE (OUTPATIENT)
Dept: FAMILY MEDICINE CLINIC | Facility: CLINIC | Age: 71
End: 2021-10-29

## 2021-11-29 ENCOUNTER — TELEPHONE (OUTPATIENT)
Dept: FAMILY MEDICINE CLINIC | Facility: CLINIC | Age: 71
End: 2021-11-29

## 2021-11-29 DIAGNOSIS — S32.425D CLOSED NONDISPLACED FRACTURE OF POSTERIOR WALL OF LEFT ACETABULUM WITH ROUTINE HEALING: Primary | ICD-10-CM

## 2021-11-29 RX ORDER — KETOROLAC TROMETHAMINE 10 MG/1
10 TABLET, FILM COATED ORAL 3 TIMES DAILY PRN
Qty: 15 TABLET | Refills: 0 | Status: SHIPPED | OUTPATIENT
Start: 2021-11-29 | End: 2022-10-26

## 2021-11-29 RX ORDER — MELOXICAM 15 MG/1
15 TABLET ORAL DAILY
Qty: 90 TABLET | Refills: 1 | Status: SHIPPED | OUTPATIENT
Start: 2021-11-29 | End: 2022-06-17 | Stop reason: SDUPTHER

## 2021-11-29 NOTE — TELEPHONE ENCOUNTER
Yes, we can do this.  Please advise him to stop the Naprosyn.  I will start him on meloxicam 15 mg once daily, every day.  I would also like to give him a small prescription for a medication called Toradol.  This is not a controlled substance.  He can take it on an as-needed basis, but no more frequently than 3/day and no longer than 5 days in a row.  Think of this as something he can use for breakthrough pain.  If these medications are not successful, then I would like to see him again and we may want to reevaluate his hip.  Thanks

## 2021-11-29 NOTE — TELEPHONE ENCOUNTER
Patient calling wanting to know if he can try meloxicam for his hip pain because the naproxen isnt helping. Thanks.  Isabell

## 2021-12-13 DIAGNOSIS — I10 ESSENTIAL HYPERTENSION: ICD-10-CM

## 2021-12-13 RX ORDER — LISINOPRIL 30 MG/1
30 TABLET ORAL DAILY
Qty: 90 TABLET | Refills: 3 | Status: SHIPPED | OUTPATIENT
Start: 2021-12-13 | End: 2022-10-26 | Stop reason: SDUPTHER

## 2022-03-16 DIAGNOSIS — I10 ESSENTIAL HYPERTENSION: ICD-10-CM

## 2022-03-16 RX ORDER — AMLODIPINE BESYLATE 10 MG/1
10 TABLET ORAL DAILY
Qty: 90 TABLET | Refills: 3 | Status: SHIPPED | OUTPATIENT
Start: 2022-03-16 | End: 2022-10-26 | Stop reason: SDUPTHER

## 2022-06-17 DIAGNOSIS — S32.425D CLOSED NONDISPLACED FRACTURE OF POSTERIOR WALL OF LEFT ACETABULUM WITH ROUTINE HEALING: ICD-10-CM

## 2022-06-17 RX ORDER — MELOXICAM 15 MG/1
15 TABLET ORAL DAILY
Qty: 90 TABLET | Refills: 1 | Status: SHIPPED | OUTPATIENT
Start: 2022-06-17 | End: 2022-06-21

## 2022-06-21 DIAGNOSIS — S32.425D CLOSED NONDISPLACED FRACTURE OF POSTERIOR WALL OF LEFT ACETABULUM WITH ROUTINE HEALING: ICD-10-CM

## 2022-06-21 RX ORDER — MELOXICAM 15 MG/1
TABLET ORAL
Qty: 90 TABLET | Refills: 3 | Status: SHIPPED | OUTPATIENT
Start: 2022-06-21 | End: 2022-10-26 | Stop reason: SDUPTHER

## 2022-10-14 NOTE — TELEPHONE ENCOUNTER
HUB TO READ:  I left  for pt to return call    Please let Alfonso know that all of his blood work from Tuesday looks great.  Everything was in ideal ranges.  Specifically, his PSA was down to 2.1.  He is not anemic.  Blood sugar is normal at 97.  Kidney and liver function testing is normal and cholesterol is at goal now.  His LDL is 61.  Recommend continuing all of his current medicines at current doses.  If he has any questions, please let me know.  Thanks!   Quality 111:Pneumonia Vaccination Status For Older Adults: Pneumococcal vaccine (PPSV23) administered on or after patient’s 60th birthday and before the end of the measurement period Detail Level: Detailed Quality 110: Preventive Care And Screening: Influenza Immunization: Influenza Immunization Administered during Influenza season

## 2022-10-26 ENCOUNTER — OFFICE VISIT (OUTPATIENT)
Dept: FAMILY MEDICINE CLINIC | Facility: CLINIC | Age: 72
End: 2022-10-26

## 2022-10-26 VITALS
SYSTOLIC BLOOD PRESSURE: 150 MMHG | DIASTOLIC BLOOD PRESSURE: 90 MMHG | WEIGHT: 194.2 LBS | RESPIRATION RATE: 16 BRPM | HEART RATE: 58 BPM | TEMPERATURE: 97.1 F | BODY MASS INDEX: 27.8 KG/M2 | OXYGEN SATURATION: 98 % | HEIGHT: 70 IN

## 2022-10-26 DIAGNOSIS — Z12.5 ENCOUNTER FOR PROSTATE CANCER SCREENING: ICD-10-CM

## 2022-10-26 DIAGNOSIS — S32.425D CLOSED NONDISPLACED FRACTURE OF POSTERIOR WALL OF LEFT ACETABULUM WITH ROUTINE HEALING: ICD-10-CM

## 2022-10-26 DIAGNOSIS — N40.1 BPH WITH OBSTRUCTION/LOWER URINARY TRACT SYMPTOMS: ICD-10-CM

## 2022-10-26 DIAGNOSIS — E78.2 MIXED HYPERLIPIDEMIA: ICD-10-CM

## 2022-10-26 DIAGNOSIS — I10 ESSENTIAL HYPERTENSION: Primary | ICD-10-CM

## 2022-10-26 DIAGNOSIS — N13.8 BPH WITH OBSTRUCTION/LOWER URINARY TRACT SYMPTOMS: ICD-10-CM

## 2022-10-26 PROBLEM — N52.8 OTHER MALE ERECTILE DYSFUNCTION: Status: ACTIVE | Noted: 2022-10-26

## 2022-10-26 PROBLEM — T14.90XA: Status: ACTIVE | Noted: 2022-10-26

## 2022-10-26 PROBLEM — S19.9XXA: Status: ACTIVE | Noted: 2022-10-26

## 2022-10-26 PROCEDURE — 99214 OFFICE O/P EST MOD 30 MIN: CPT | Performed by: FAMILY MEDICINE

## 2022-10-26 RX ORDER — TADALAFIL 5 MG/1
5 TABLET ORAL DAILY PRN
Qty: 90 TABLET | Refills: 1 | Status: SHIPPED | OUTPATIENT
Start: 2022-10-26

## 2022-10-26 RX ORDER — AMLODIPINE BESYLATE 10 MG/1
10 TABLET ORAL DAILY
Qty: 90 TABLET | Refills: 3 | Status: SHIPPED | OUTPATIENT
Start: 2022-10-26

## 2022-10-26 RX ORDER — MELOXICAM 15 MG/1
15 TABLET ORAL DAILY
Qty: 90 TABLET | Refills: 3 | Status: SHIPPED | OUTPATIENT
Start: 2022-10-26

## 2022-10-26 RX ORDER — LISINOPRIL 30 MG/1
30 TABLET ORAL DAILY
Qty: 90 TABLET | Refills: 3 | Status: SHIPPED | OUTPATIENT
Start: 2022-10-26

## 2022-10-26 RX ORDER — ATORVASTATIN CALCIUM 20 MG/1
20 TABLET, FILM COATED ORAL DAILY
Qty: 90 TABLET | Refills: 3 | Status: SHIPPED | OUTPATIENT
Start: 2022-10-26

## 2022-10-26 NOTE — PROGRESS NOTES
Subjective   Alfonso Bull is a 72 y.o. male.   Chief Complaint   Patient presents with   • Hypertension   • Hyperlipidemia       History of Present Illness   Presents to the office today for annual follow-up on chronic medical problems for active problem list as below.    Medication list reviewed as below.  Last lab work was 1 year ago.    HTN- checks his blood pressure at home.  Usually in the 120s and 130s.  No side effects of amlodipine or lisinopril such as cough or swelling in his feet.  He was rushing a little bit this morning.    HLD- tolerating Lipitor without myalgias.  Last lipid panel at a year ago was good.    Preventive care- PSA 1 year ago was 2.1.  He is a non-smoker.  Colonoscopy was in 2018.    He mentions some occasional neck stiffness soreness, grinding when he rotates his head.  See description of injury below.    He has having some urinary hesitancy.  He is also having some problems with erectile dysfunction.  Still able to get an erection, but loses it quicker than he used to.      Patient Active Problem List    Diagnosis Date Noted   • BPH with obstruction/lower urinary tract symptoms 10/26/2022   • Other male erectile dysfunction 10/26/2022   • Mixed hyperlipidemia 10/26/2021   • Closed nondisplaced fracture of posterior wall of left acetabulum with routine healing 10/22/2019     Note Last Updated: 10/22/2019     DX on 10/21/19     • Essential hypertension 10/21/2019           Past Surgical History:   Procedure Laterality Date   • COLONOSCOPY  03/01/2018   • ENDOSCOPY  01/01/2011   • TONSILLECTOMY       Current Outpatient Medications on File Prior to Visit   Medication Sig   • [DISCONTINUED] amLODIPine (NORVASC) 10 MG tablet Take 1 tablet by mouth Daily.   • [DISCONTINUED] atorvastatin (LIPITOR) 20 MG tablet TAKE ONE TABLET BY MOUTH EVERY DAY   • [DISCONTINUED] lisinopril (PRINIVIL,ZESTRIL) 30 MG tablet Take 1 tablet by mouth Daily.   • [DISCONTINUED] meloxicam (MOBIC) 15 MG tablet Take 1  "tablet by mouth once daily   • [DISCONTINUED] ketorolac (TORADOL) 10 MG tablet Take 1 tablet by mouth 3 (Three) Times a Day As Needed for Moderate Pain .     No current facility-administered medications on file prior to visit.     No Known Allergies  Social History     Socioeconomic History   • Marital status:    Tobacco Use   • Smoking status: Never     Passive exposure: Never   • Smokeless tobacco: Never   Vaping Use   • Vaping Use: Never used   Substance and Sexual Activity   • Alcohol use: Not Currently     Comment: socially   • Drug use: Never   • Sexual activity: Yes     Partners: Female     Family History   Problem Relation Age of Onset   • No Known Problems Mother    • No Known Problems Father    • Aneurysm Sister    • No Known Problems Brother    • No Known Problems Son    • No Known Problems Sister    • No Known Problems Son        Review of Systems    Objective   /90 (BP Location: Left arm, Patient Position: Sitting, Cuff Size: Adult)   Pulse 58   Temp 97.1 °F (36.2 °C) (Infrared)   Resp 16   Ht 177.8 cm (70\")   Wt 88.1 kg (194 lb 3.2 oz)   SpO2 98%   BMI 27.86 kg/m²   Physical Exam  Constitutional:       Appearance: He is well-developed. He is not toxic-appearing.      Comments: Wearing a face mask     HENT:      Head: Normocephalic and atraumatic.   Eyes:      Conjunctiva/sclera: Conjunctivae normal.   Cardiovascular:      Rate and Rhythm: Normal rate and regular rhythm.      Heart sounds: No murmur heard.  Pulmonary:      Effort: Pulmonary effort is normal. No respiratory distress.      Breath sounds: Normal breath sounds.   Musculoskeletal:         General: Normal range of motion.      Cervical back: Normal range of motion.      Right lower leg: No edema.      Left lower leg: No edema.   Skin:     General: Skin is warm and dry.      Findings: No rash.   Neurological:      Mental Status: He is alert and oriented to person, place, and time.   Psychiatric:         Mood and Affect: " Mood normal.         Behavior: Behavior normal.           No visits with results within 4 Month(s) from this visit.   Latest known visit with results is:   Office Visit on 10/26/2021   Component Date Value Ref Range Status   • Glucose 10/26/2021 97  65 - 99 mg/dL Final   • BUN 10/26/2021 22  8 - 27 mg/dL Final   • Creatinine 10/26/2021 1.14  0.76 - 1.27 mg/dL Final   • eGFR Non  Am 10/26/2021 65  >59 mL/min/1.73 Final   • eGFR African Am 10/26/2021 75  >59 mL/min/1.73 Final    Comment: **In accordance with recommendations from the NKF-ASN Task force,**    [facility] is in the process of updating its eGFR calculation to the    2021 CKD-EPI creatinine equation that estimates kidney function    without a race variable.     • BUN/Creatinine Ratio 10/26/2021 19  10 - 24 Final   • Sodium 10/26/2021 139  134 - 144 mmol/L Final   • Potassium 10/26/2021 4.6  3.5 - 5.2 mmol/L Final   • Chloride 10/26/2021 104  96 - 106 mmol/L Final   • Total CO2 10/26/2021 25  20 - 29 mmol/L Final   • Calcium 10/26/2021 9.3  8.6 - 10.2 mg/dL Final   • Total Protein 10/26/2021 6.9  6.0 - 8.5 g/dL Final   • Albumin 10/26/2021 4.2  3.8 - 4.8 g/dL Final   • Globulin 10/26/2021 2.7  1.5 - 4.5 g/dL Final   • A/G Ratio 10/26/2021 1.6  1.2 - 2.2 Final   • Total Bilirubin 10/26/2021 1.0  0.0 - 1.2 mg/dL Final   • Alkaline Phosphatase 10/26/2021 97  44 - 121 IU/L Final                  **Please note reference interval change**   • AST (SGOT) 10/26/2021 15  0 - 40 IU/L Final   • ALT (SGPT) 10/26/2021 15  0 - 44 IU/L Final   • Total Cholesterol 10/26/2021 115  100 - 199 mg/dL Final   • Triglycerides 10/26/2021 64  0 - 149 mg/dL Final   • HDL Cholesterol 10/26/2021 40  >39 mg/dL Final   • VLDL Cholesterol Chris 10/26/2021 14  5 - 40 mg/dL Final   • LDL Chol Calc ([facility]) 10/26/2021 61  0 - 99 mg/dL Final   • PSA 10/26/2021 2.1  0.0 - 4.0 ng/mL Final    Comment: Roche ECLIA methodology.  According to the American Urological Association, Serum PSA  should  decrease and remain at undetectable levels after radical  prostatectomy. The AUA defines biochemical recurrence as an initial  PSA value 0.2 ng/mL or greater followed by a subsequent confirmatory  PSA value 0.2 ng/mL or greater.  Values obtained with different assay methods or kits cannot be used  interchangeably. Results cannot be interpreted as absolute evidence  of the presence or absence of malignant disease.     • WBC 10/26/2021 6.9  3.4 - 10.8 x10E3/uL Final   • RBC 10/26/2021 4.81  4.14 - 5.80 x10E6/uL Final   • Hemoglobin 10/26/2021 14.4  13.0 - 17.7 g/dL Final   • Hematocrit 10/26/2021 43.2  37.5 - 51.0 % Final   • MCV 10/26/2021 90  79 - 97 fL Final   • MCH 10/26/2021 29.9  26.6 - 33.0 pg Final   • MCHC 10/26/2021 33.3  31.5 - 35.7 g/dL Final   • RDW 10/26/2021 13.1  11.6 - 15.4 % Final   • Platelets 10/26/2021 176  150 - 450 x10E3/uL Final   • Neutrophil Rel % 10/26/2021 62  Not Estab. % Final   • Lymphocyte Rel % 10/26/2021 25  Not Estab. % Final   • Monocyte Rel % 10/26/2021 10  Not Estab. % Final   • Eosinophil Rel % 10/26/2021 2  Not Estab. % Final   • Basophil Rel % 10/26/2021 1  Not Estab. % Final   • Neutrophils Absolute 10/26/2021 4.3  1.4 - 7.0 x10E3/uL Final   • Lymphocytes Absolute 10/26/2021 1.7  0.7 - 3.1 x10E3/uL Final   • Monocytes Absolute 10/26/2021 0.7  0.1 - 0.9 x10E3/uL Final   • Eosinophils Absolute 10/26/2021 0.2  0.0 - 0.4 x10E3/uL Final   • Basophils Absolute 10/26/2021 0.1  0.0 - 0.2 x10E3/uL Final   • Immature Granulocyte Rel % 10/26/2021 0  Not Estab. % Final   • Immature Grans Absolute 10/26/2021 0.0  0.0 - 0.1 x10E3/uL Final         Assessment & Plan   Diagnoses and all orders for this visit:    1. Essential hypertension (Primary)  -     CBC & Differential  -     Comprehensive Metabolic Panel  -     lisinopril (PRINIVIL,ZESTRIL) 30 MG tablet; Take 1 tablet by mouth Daily.  Dispense: 90 tablet; Refill: 3  -     amLODIPine (NORVASC) 10 MG tablet; Take 1 tablet by mouth  Daily.  Dispense: 90 tablet; Refill: 3    2. Mixed hyperlipidemia  -     Lipid Panel  -     atorvastatin (LIPITOR) 20 MG tablet; Take 1 tablet by mouth Daily.  Dispense: 90 tablet; Refill: 3    3. Encounter for prostate cancer screening  -     PSA Screen    4. Closed nondisplaced fracture of posterior wall of left acetabulum with routine healing  -     meloxicam (MOBIC) 15 MG tablet; Take 1 tablet by mouth Daily.  Dispense: 90 tablet; Refill: 3    5. BPH with obstruction/lower urinary tract symptoms  -     tadalafil (Cialis) 5 MG tablet; Take 1 tablet by mouth Daily As Needed for Erectile Dysfunction.  Dispense: 90 tablet; Refill: 1    Status of multiple chronic medical problems reviewed today.  Hypertensions under reasonable control.  Numbers at home are a little better than they are here in the office today.  Refill lisinopril and amlodipine at current doses.  Continue Lipitor 20 mg/day.  Get labs today to follow status of his cholesterol.  Screening for diabetes and anemia.  Do a PSA to screen for prostate cancer.  We will do a trial of once daily Cialis to see if that helps his BPH symptoms.  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  To follow-up with him when the results of his test are back.  Follow-up in 1 year or sooner if needed.          Call with any problems or concerns before next visit       Return in about 1 year (around 10/26/2023).      Much of this report is an electronic transcription of spoken language to printed text using Dragon dictation software.  As such, the subtleties and finesse of spoken language may permit erroneous, or at times, nonsensical words or phrases to be inadvertently transcribed; thus changes may be made at a later date to rectify these errors.     Winter Hall MD10/26/123525:44 EDT  This note has been electronically signed

## 2022-10-27 ENCOUNTER — TELEPHONE (OUTPATIENT)
Dept: FAMILY MEDICINE CLINIC | Facility: CLINIC | Age: 72
End: 2022-10-27

## 2022-10-27 LAB
ALBUMIN SERPL-MCNC: 4.4 G/DL (ref 3.8–4.8)
ALBUMIN/GLOB SERPL: 1.6 {RATIO} (ref 1.2–2.2)
ALP SERPL-CCNC: 107 IU/L (ref 44–121)
ALT SERPL-CCNC: 21 IU/L (ref 0–44)
AST SERPL-CCNC: 18 IU/L (ref 0–40)
BASOPHILS # BLD AUTO: 0.1 X10E3/UL (ref 0–0.2)
BASOPHILS NFR BLD AUTO: 1 %
BILIRUB SERPL-MCNC: 1 MG/DL (ref 0–1.2)
BUN SERPL-MCNC: 16 MG/DL (ref 8–27)
BUN/CREAT SERPL: 14 (ref 10–24)
CALCIUM SERPL-MCNC: 9.2 MG/DL (ref 8.6–10.2)
CHLORIDE SERPL-SCNC: 104 MMOL/L (ref 96–106)
CHOLEST SERPL-MCNC: 122 MG/DL (ref 100–199)
CO2 SERPL-SCNC: 25 MMOL/L (ref 20–29)
CREAT SERPL-MCNC: 1.13 MG/DL (ref 0.76–1.27)
EGFRCR SERPLBLD CKD-EPI 2021: 70 ML/MIN/1.73
EOSINOPHIL # BLD AUTO: 0.2 X10E3/UL (ref 0–0.4)
EOSINOPHIL NFR BLD AUTO: 4 %
ERYTHROCYTE [DISTWIDTH] IN BLOOD BY AUTOMATED COUNT: 13 % (ref 11.6–15.4)
GLOBULIN SER CALC-MCNC: 2.8 G/DL (ref 1.5–4.5)
GLUCOSE SERPL-MCNC: 98 MG/DL (ref 70–99)
HCT VFR BLD AUTO: 44.6 % (ref 37.5–51)
HDLC SERPL-MCNC: 41 MG/DL
HGB BLD-MCNC: 15 G/DL (ref 13–17.7)
IMM GRANULOCYTES # BLD AUTO: 0 X10E3/UL (ref 0–0.1)
IMM GRANULOCYTES NFR BLD AUTO: 0 %
LDLC SERPL CALC-MCNC: 69 MG/DL (ref 0–99)
LYMPHOCYTES # BLD AUTO: 1.5 X10E3/UL (ref 0.7–3.1)
LYMPHOCYTES NFR BLD AUTO: 28 %
MCH RBC QN AUTO: 30.4 PG (ref 26.6–33)
MCHC RBC AUTO-ENTMCNC: 33.6 G/DL (ref 31.5–35.7)
MCV RBC AUTO: 90 FL (ref 79–97)
MONOCYTES # BLD AUTO: 0.6 X10E3/UL (ref 0.1–0.9)
MONOCYTES NFR BLD AUTO: 10 %
NEUTROPHILS # BLD AUTO: 3.1 X10E3/UL (ref 1.4–7)
NEUTROPHILS NFR BLD AUTO: 57 %
PLATELET # BLD AUTO: 170 X10E3/UL (ref 150–450)
POTASSIUM SERPL-SCNC: 4.3 MMOL/L (ref 3.5–5.2)
PROT SERPL-MCNC: 7.2 G/DL (ref 6–8.5)
PSA SERPL-MCNC: 2.5 NG/ML (ref 0–4)
RBC # BLD AUTO: 4.94 X10E6/UL (ref 4.14–5.8)
SODIUM SERPL-SCNC: 141 MMOL/L (ref 134–144)
TRIGL SERPL-MCNC: 54 MG/DL (ref 0–149)
VLDLC SERPL CALC-MCNC: 12 MG/DL (ref 5–40)
WBC # BLD AUTO: 5.5 X10E3/UL (ref 3.4–10.8)

## 2022-10-27 NOTE — TELEPHONE ENCOUNTER
HUB TO READ     LEFT MESSAGE TO RETURN CALL     Please tell Alfonso that his blood work from yesterday was all great.  He is not anemic.  No sign of diabetes.  Blood sugar is normal at 98.  Kidney and liver function is normal.  Cholesterol levels are all in perfect ranges.  Continue his Lipitor at the current dose.  PSA is very stable at 2.5.  Overall, no changes based on these labs.  If he has any questions, please let me know.

## 2022-10-28 NOTE — TELEPHONE ENCOUNTER
SPOKE WITH PATIENTS WIFE SHE JUST WANTS THEM PRINTED AND GAVE HER THEIR DAUGHTER IN LAW TO TAKE HOME AND GIVE TO THEM

## 2023-06-01 ENCOUNTER — OFFICE VISIT (OUTPATIENT)
Dept: FAMILY MEDICINE CLINIC | Facility: CLINIC | Age: 73
End: 2023-06-01

## 2023-06-01 VITALS
SYSTOLIC BLOOD PRESSURE: 108 MMHG | HEART RATE: 61 BPM | BODY MASS INDEX: 28.2 KG/M2 | HEIGHT: 70 IN | DIASTOLIC BLOOD PRESSURE: 64 MMHG | TEMPERATURE: 97.9 F | WEIGHT: 197 LBS | OXYGEN SATURATION: 97 %

## 2023-06-01 DIAGNOSIS — H65.02 NON-RECURRENT ACUTE SEROUS OTITIS MEDIA OF LEFT EAR: Primary | ICD-10-CM

## 2023-06-01 PROCEDURE — 3078F DIAST BP <80 MM HG: CPT | Performed by: NURSE PRACTITIONER

## 2023-06-01 PROCEDURE — 1160F RVW MEDS BY RX/DR IN RCRD: CPT | Performed by: NURSE PRACTITIONER

## 2023-06-01 PROCEDURE — 1159F MED LIST DOCD IN RCRD: CPT | Performed by: NURSE PRACTITIONER

## 2023-06-01 PROCEDURE — 99213 OFFICE O/P EST LOW 20 MIN: CPT | Performed by: NURSE PRACTITIONER

## 2023-06-01 PROCEDURE — 3074F SYST BP LT 130 MM HG: CPT | Performed by: NURSE PRACTITIONER

## 2023-06-01 RX ORDER — AMOXICILLIN AND CLAVULANATE POTASSIUM 875; 125 MG/1; MG/1
1 TABLET, FILM COATED ORAL 2 TIMES DAILY
Qty: 14 TABLET | Refills: 0 | Status: SHIPPED | OUTPATIENT
Start: 2023-06-01

## 2023-06-01 NOTE — PROGRESS NOTES
"  Chief Complaint  Earache    Subjective          Alfonso Bull presents to [facility]      History of Present Illness    Alfonso is a 72-year-old male patient who presents today with earache.    Symptoms began about 5 to 7 days ago.  Left ear is most bothersome with some otalgia present behind the ear.  Patient reports sensation of \"fluid moving\".  No other symptoms.  Afebrile.      Objective     Vital Signs:   /64 (BP Location: Right arm, Patient Position: Sitting, Cuff Size: Adult)   Pulse 61   Temp 97.9 °F (36.6 °C) (Infrared)   Ht 177.8 cm (70\")   Wt 89.4 kg (197 lb)   SpO2 97%   BMI 28.27 kg/m²           Physical Exam  Vitals reviewed.   Constitutional:       Appearance: He is well-developed.      Comments: Wearing a face mask     HENT:      Head: Normocephalic and atraumatic.      Right Ear: Hearing, tympanic membrane, ear canal and external ear normal. No middle ear effusion.      Left Ear: Hearing, ear canal and external ear normal. Tenderness present. Tympanic membrane is erythematous.      Mouth/Throat:      Mouth: Mucous membranes are moist.      Pharynx: Oropharynx is clear.   Eyes:      Conjunctiva/sclera: Conjunctivae normal.      Pupils: Pupils are equal, round, and reactive to light.   Cardiovascular:      Rate and Rhythm: Normal rate.   Pulmonary:      Effort: Pulmonary effort is normal. No respiratory distress.   Musculoskeletal:         General: Normal range of motion.      Cervical back: Normal range of motion.   Skin:     General: Skin is warm and dry.      Findings: No rash.   Neurological:      Mental Status: He is alert and oriented to person, place, and time.   Psychiatric:         Behavior: Behavior normal.                Result Review :                                   Assessment and Plan      Diagnoses and all orders for this visit:    1. Non-recurrent acute serous otitis media of left ear (Primary)  -     amoxicillin-clavulanate " (AUGMENTIN) 875-125 MG per tablet; Take 1 tablet by mouth 2 (Two) Times a Day.  Dispense: 14 tablet; Refill: 0            Follow Up       No follow-ups on file.      Patient was given instructions and counseling regarding his condition or for health maintenance advice. Please see specific information pulled into the AVS if appropriate.     Vane Fine, APRN6/1/202311:46 EDT  This note has been electronically signed       normal...

## 2023-10-27 ENCOUNTER — OFFICE VISIT (OUTPATIENT)
Dept: FAMILY MEDICINE CLINIC | Facility: CLINIC | Age: 73
End: 2023-10-27
Payer: MEDICARE

## 2023-10-27 VITALS
DIASTOLIC BLOOD PRESSURE: 74 MMHG | TEMPERATURE: 97.3 F | BODY MASS INDEX: 28.12 KG/M2 | SYSTOLIC BLOOD PRESSURE: 128 MMHG | WEIGHT: 196.4 LBS | HEIGHT: 70 IN | HEART RATE: 51 BPM | OXYGEN SATURATION: 96 % | RESPIRATION RATE: 18 BRPM

## 2023-10-27 DIAGNOSIS — N52.8 OTHER MALE ERECTILE DYSFUNCTION: ICD-10-CM

## 2023-10-27 DIAGNOSIS — I10 ESSENTIAL HYPERTENSION: Primary | ICD-10-CM

## 2023-10-27 DIAGNOSIS — N40.1 BPH WITH OBSTRUCTION/LOWER URINARY TRACT SYMPTOMS: ICD-10-CM

## 2023-10-27 DIAGNOSIS — Z23 NEED FOR INFLUENZA VACCINATION: ICD-10-CM

## 2023-10-27 DIAGNOSIS — J30.1 SEASONAL ALLERGIC RHINITIS DUE TO POLLEN: ICD-10-CM

## 2023-10-27 DIAGNOSIS — M54.2 NECK PAIN: ICD-10-CM

## 2023-10-27 DIAGNOSIS — H66.002 NON-RECURRENT ACUTE SUPPURATIVE OTITIS MEDIA OF LEFT EAR WITHOUT SPONTANEOUS RUPTURE OF TYMPANIC MEMBRANE: ICD-10-CM

## 2023-10-27 DIAGNOSIS — E78.2 MIXED HYPERLIPIDEMIA: ICD-10-CM

## 2023-10-27 DIAGNOSIS — Z12.5 ENCOUNTER FOR PROSTATE CANCER SCREENING: ICD-10-CM

## 2023-10-27 DIAGNOSIS — N13.8 BPH WITH OBSTRUCTION/LOWER URINARY TRACT SYMPTOMS: ICD-10-CM

## 2023-10-27 DIAGNOSIS — S32.425D CLOSED NONDISPLACED FRACTURE OF POSTERIOR WALL OF LEFT ACETABULUM WITH ROUTINE HEALING: ICD-10-CM

## 2023-10-27 RX ORDER — ATORVASTATIN CALCIUM 20 MG/1
20 TABLET, FILM COATED ORAL DAILY
Qty: 90 TABLET | Refills: 3 | Status: SHIPPED | OUTPATIENT
Start: 2023-10-27

## 2023-10-27 RX ORDER — AZITHROMYCIN 250 MG/1
TABLET, FILM COATED ORAL
Qty: 6 TABLET | Refills: 0 | Status: SHIPPED | OUTPATIENT
Start: 2023-10-27

## 2023-10-27 RX ORDER — MELOXICAM 15 MG/1
15 TABLET ORAL DAILY
Qty: 90 TABLET | Refills: 3 | Status: SHIPPED | OUTPATIENT
Start: 2023-10-27

## 2023-10-27 RX ORDER — LISINOPRIL 30 MG/1
30 TABLET ORAL DAILY
Qty: 90 TABLET | Refills: 3 | Status: SHIPPED | OUTPATIENT
Start: 2023-10-27

## 2023-10-27 RX ORDER — AMLODIPINE BESYLATE 10 MG/1
10 TABLET ORAL DAILY
Qty: 90 TABLET | Refills: 3 | Status: SHIPPED | OUTPATIENT
Start: 2023-10-27

## 2023-10-27 NOTE — PROGRESS NOTES
Subjective   Alfonso Bull is a 73 y.o. male.   Chief Complaint   Patient presents with    Hypertension    Hyperlipidemia    Benign Prostatic Hypertrophy       History of Present Illness   Presents to the office today for annual follow-up on chronic medical problems for active problem list as below.     Medication list reviewed as below.  Last lab work was 1 year ago.     HTN-blood pressure in the office today is excellent at 128/74.  checks his blood pressure at home.  Usually in the 120s and 130s.  No side effects of amlodipine or lisinopril such as cough or swelling in his feet.       HLD- tolerating Lipitor without myalgias.  Last lipid panel was a year ago was good.     Preventive care- PSA 1 year ago was 2.1.  He is a non-smoker.  Colonoscopy was in 2018.     Was given a prescription for meloxicam a year ago for arthritic pains.    He has several other problems today.  Tells me back in 2014 he had a fall while working on a house.  Landed on his head.  Had a lot of neck pain.  It was x-rayed back then and he was told that he had swelling around the spinal cord.  Never followed up after that.  Now he has a lot of neck pain and stiffness at times.  Sometimes severe sharp sudden pain.  Not associated with loss of motor control of his arms.    He was in [facility] in June for ear pain, pressure, decreased hearing.          Patient Active Problem List    Diagnosis Date Noted    BPH with obstruction/lower urinary tract symptoms 10/26/2022    Other male erectile dysfunction 10/26/2022    Traumatic injury of posterior neck and torso 10/26/2022     Note Last Updated: 10/26/2022     Fell into a hole in 2018.  Landed on his head.  Severe neck pain.  No fracture.  Pretty much full recovery, but continues to have occasional crunching and grinding in his neck when he moves his neck.      Mixed hyperlipidemia 10/26/2021    Closed nondisplaced fracture of posterior wall of left acetabulum with routine healing 10/22/2019  "    Note Last Updated: 10/22/2019     DX on 10/21/19      Essential hypertension 10/21/2019           Past Surgical History:   Procedure Laterality Date    COLONOSCOPY  03/01/2018    ENDOSCOPY  01/01/2011    TONSILLECTOMY       Current Outpatient Medications on File Prior to Visit   Medication Sig    tadalafil (Cialis) 5 MG tablet Take 1 tablet by mouth Daily As Needed for Erectile Dysfunction.    [DISCONTINUED] amLODIPine (NORVASC) 10 MG tablet Take 1 tablet by mouth Daily.    [DISCONTINUED] atorvastatin (LIPITOR) 20 MG tablet Take 1 tablet by mouth Daily.    [DISCONTINUED] lisinopril (PRINIVIL,ZESTRIL) 30 MG tablet Take 1 tablet by mouth Daily.    [DISCONTINUED] meloxicam (MOBIC) 15 MG tablet Take 1 tablet by mouth Daily.    [DISCONTINUED] amoxicillin-clavulanate (AUGMENTIN) 875-125 MG per tablet Take 1 tablet by mouth 2 (Two) Times a Day.     No current facility-administered medications on file prior to visit.     No Known Allergies  Social History     Socioeconomic History    Marital status:    Tobacco Use    Smoking status: Never     Passive exposure: Never    Smokeless tobacco: Never   Vaping Use    Vaping Use: Never used   Substance and Sexual Activity    Alcohol use: Not Currently     Comment: socially    Drug use: Never    Sexual activity: Yes     Partners: Female     Family History   Problem Relation Age of Onset    No Known Problems Mother     No Known Problems Father     Aneurysm Sister     No Known Problems Brother     No Known Problems Son     No Known Problems Sister     No Known Problems Son        Review of Systems    Objective   /74 (BP Location: Right arm, Patient Position: Sitting, Cuff Size: Adult)   Pulse 51   Temp 97.3 °F (36.3 °C) (Infrared)   Resp 18   Ht 177.8 cm (70\")   Wt 89.1 kg (196 lb 6.4 oz)   SpO2 96%   BMI 28.18 kg/m²   Physical Exam  Constitutional:       Appearance: He is well-developed. He is not toxic-appearing.      Comments:      HENT:      Head: " Normocephalic and atraumatic.      Right Ear: External ear normal.      Left Ear: External ear normal.      Ears:      Comments: Left TM is opaque-layered fluid.  Eyes:      Conjunctiva/sclera: Conjunctivae normal.   Neck:      Comments: Crepitus to flexion, extension, rotation of his neck.  Cardiovascular:      Rate and Rhythm: Normal rate and regular rhythm.      Heart sounds: No murmur heard.  Pulmonary:      Effort: Pulmonary effort is normal. No respiratory distress.      Breath sounds: Normal breath sounds.   Musculoskeletal:         General: Normal range of motion.      Cervical back: Normal range of motion. No rigidity.      Right lower leg: No edema.      Left lower leg: No edema.   Skin:     General: Skin is warm and dry.      Findings: No rash.   Neurological:      Mental Status: He is alert and oriented to person, place, and time.   Psychiatric:         Mood and Affect: Mood normal.         Behavior: Behavior normal.           No visits with results within 4 Month(s) from this visit.   Latest known visit with results is:   Office Visit on 10/26/2022   Component Date Value Ref Range Status    WBC 10/26/2022 5.5  3.4 - 10.8 x10E3/uL Final    RBC 10/26/2022 4.94  4.14 - 5.80 x10E6/uL Final    Hemoglobin 10/26/2022 15.0  13.0 - 17.7 g/dL Final    Hematocrit 10/26/2022 44.6  37.5 - 51.0 % Final    MCV 10/26/2022 90  79 - 97 fL Final    MCH 10/26/2022 30.4  26.6 - 33.0 pg Final    MCHC 10/26/2022 33.6  31.5 - 35.7 g/dL Final    RDW 10/26/2022 13.0  11.6 - 15.4 % Final    Platelets 10/26/2022 170  150 - 450 x10E3/uL Final    Neutrophil Rel % 10/26/2022 57  Not Estab. % Final    Lymphocyte Rel % 10/26/2022 28  Not Estab. % Final    Monocyte Rel % 10/26/2022 10  Not Estab. % Final    Eosinophil Rel % 10/26/2022 4  Not Estab. % Final    Basophil Rel % 10/26/2022 1  Not Estab. % Final    Neutrophils Absolute 10/26/2022 3.1  1.4 - 7.0 x10E3/uL Final    Lymphocytes Absolute 10/26/2022 1.5  0.7 - 3.1 x10E3/uL Final     Monocytes Absolute 10/26/2022 0.6  0.1 - 0.9 x10E3/uL Final    Eosinophils Absolute 10/26/2022 0.2  0.0 - 0.4 x10E3/uL Final    Basophils Absolute 10/26/2022 0.1  0.0 - 0.2 x10E3/uL Final    Immature Granulocyte Rel % 10/26/2022 0  Not Estab. % Final    Immature Grans Absolute 10/26/2022 0.0  0.0 - 0.1 x10E3/uL Final    Glucose 10/26/2022 98  70 - 99 mg/dL Final    BUN 10/26/2022 16  8 - 27 mg/dL Final    Creatinine 10/26/2022 1.13  0.76 - 1.27 mg/dL Final    EGFR Result 10/26/2022 70  >59 mL/min/1.73 Final    BUN/Creatinine Ratio 10/26/2022 14  10 - 24 Final    Sodium 10/26/2022 141  134 - 144 mmol/L Final    Potassium 10/26/2022 4.3  3.5 - 5.2 mmol/L Final    Chloride 10/26/2022 104  96 - 106 mmol/L Final    Total CO2 10/26/2022 25  20 - 29 mmol/L Final    Calcium 10/26/2022 9.2  8.6 - 10.2 mg/dL Final    Total Protein 10/26/2022 7.2  6.0 - 8.5 g/dL Final    Albumin 10/26/2022 4.4  3.8 - 4.8 g/dL Final    Globulin 10/26/2022 2.8  1.5 - 4.5 g/dL Final    A/G Ratio 10/26/2022 1.6  1.2 - 2.2 Final    Total Bilirubin 10/26/2022 1.0  0.0 - 1.2 mg/dL Final    Alkaline Phosphatase 10/26/2022 107  44 - 121 IU/L Final    AST (SGOT) 10/26/2022 18  0 - 40 IU/L Final    ALT (SGPT) 10/26/2022 21  0 - 44 IU/L Final    Total Cholesterol 10/26/2022 122  100 - 199 mg/dL Final    Triglycerides 10/26/2022 54  0 - 149 mg/dL Final    HDL Cholesterol 10/26/2022 41  >39 mg/dL Final    VLDL Cholesterol Chris 10/26/2022 12  5 - 40 mg/dL Final    LDL Chol Calc ([facility]) 10/26/2022 69  0 - 99 mg/dL Final    PSA 10/26/2022 2.5  0.0 - 4.0 ng/mL Final    Comment: Roche ECLIA methodology.  According to the American Urological Association, Serum PSA should  decrease and remain at undetectable levels after radical  prostatectomy. The AUA defines biochemical recurrence as an initial  PSA value 0.2 ng/mL or greater followed by a subsequent confirmatory  PSA value 0.2 ng/mL or greater.  Values obtained with different assay methods or kits cannot be  used  interchangeably. Results cannot be interpreted as absolute evidence  of the presence or absence of malignant disease.              Assessment & Plan   Diagnoses and all orders for this visit:    1. Essential hypertension (Primary)  -     CBC & Differential  -     Comprehensive Metabolic Panel  -     amLODIPine (NORVASC) 10 MG tablet; Take 1 tablet by mouth Daily.  Dispense: 90 tablet; Refill: 3  -     lisinopril (PRINIVIL,ZESTRIL) 30 MG tablet; Take 1 tablet by mouth Daily.  Dispense: 90 tablet; Refill: 3    2. Mixed hyperlipidemia  -     Lipid Panel  -     atorvastatin (LIPITOR) 20 MG tablet; Take 1 tablet by mouth Daily.  Dispense: 90 tablet; Refill: 3    3. BPH with obstruction/lower urinary tract symptoms    4. Other male erectile dysfunction    5. Need for influenza vaccination  -     Fluzone High-Dose 65+yrs    6. Encounter for prostate cancer screening  -     PSA Screen    7. Neck pain  -     XR Spine Cervical 2 or 3 View    8. Seasonal allergic rhinitis due to pollen    9. Non-recurrent acute suppurative otitis media of left ear without spontaneous rupture of tympanic membrane  -     azithromycin (Zithromax Z-Larry) 250 MG tablet; Take 2 tablets by mouth on day 1, then 1 tablet daily on days 2-5  Dispense: 6 tablet; Refill: 0    10. Closed nondisplaced fracture of posterior wall of left acetabulum with routine healing  -     meloxicam (MOBIC) 15 MG tablet; Take 1 tablet by mouth Daily.  Dispense: 90 tablet; Refill: 3    Tremendous number of things going on here today.  Flu shot today.  Blood pressure control is good.  Continue both blood pressure medicines.  Refill those today.  Check labs to follow his high cholesterol.  Continue atorvastatin.  Refill that at the current dose.  Other labs to screen for anemia, diabetes, prostate cancer.  Does not take Cialis every day.  Does not need a refill on that.  Able to void okay.  Probably has some upper respiratory congestion causing eustachian tube dysfunction  and fluid behind his ears.  Looks a little infected today.  Treat with azithromycin.  Of also recommended that he start a nasal steroid on a daily basis.    he had an old injury to his neck.  He is already on meloxicam.  Continue that.  We will do an x-ray today to just see what the status of his bony anatomy is in his neck.  He does not have any warning signs.  We will follow this condition expectantly.  I will follow-up with him when all these test are back.  We will see him again here in a year or sooner if needed.      Call with any problems or concerns before next visit       Return in about 1 year (around 10/27/2024).      Much of this report is an electronic transcription of spoken language to printed text using Dragon dictation software.  As such, the subtleties and finesse of spoken language may permit erroneous, or at times, nonsensical words or phrases to be inadvertently transcribed; thus changes may be made at a later date to rectify these errors.     Winter Hall MD10/27/[id number]:51 EDT  This note has been electronically signed

## 2023-10-28 LAB
ALBUMIN SERPL-MCNC: 4.3 G/DL (ref 3.8–4.8)
ALBUMIN/GLOB SERPL: 1.5 {RATIO} (ref 1.2–2.2)
ALP SERPL-CCNC: 110 IU/L (ref 44–121)
ALT SERPL-CCNC: 16 IU/L (ref 0–44)
AST SERPL-CCNC: 20 IU/L (ref 0–40)
BASOPHILS # BLD AUTO: 0.1 X10E3/UL (ref 0–0.2)
BASOPHILS NFR BLD AUTO: 1 %
BILIRUB SERPL-MCNC: 1.2 MG/DL (ref 0–1.2)
BUN SERPL-MCNC: 20 MG/DL (ref 8–27)
BUN/CREAT SERPL: 16 (ref 10–24)
CALCIUM SERPL-MCNC: 9.2 MG/DL (ref 8.6–10.2)
CHLORIDE SERPL-SCNC: 102 MMOL/L (ref 96–106)
CHOLEST SERPL-MCNC: 108 MG/DL (ref 100–199)
CO2 SERPL-SCNC: 25 MMOL/L (ref 20–29)
CREAT SERPL-MCNC: 1.22 MG/DL (ref 0.76–1.27)
EGFRCR SERPLBLD CKD-EPI 2021: 63 ML/MIN/1.73
EOSINOPHIL # BLD AUTO: 0.2 X10E3/UL (ref 0–0.4)
EOSINOPHIL NFR BLD AUTO: 3 %
ERYTHROCYTE [DISTWIDTH] IN BLOOD BY AUTOMATED COUNT: 12.8 % (ref 11.6–15.4)
GLOBULIN SER CALC-MCNC: 2.8 G/DL (ref 1.5–4.5)
GLUCOSE SERPL-MCNC: 96 MG/DL (ref 70–99)
HCT VFR BLD AUTO: 45.7 % (ref 37.5–51)
HDLC SERPL-MCNC: 44 MG/DL
HGB BLD-MCNC: 15.3 G/DL (ref 13–17.7)
IMM GRANULOCYTES # BLD AUTO: 0 X10E3/UL (ref 0–0.1)
IMM GRANULOCYTES NFR BLD AUTO: 0 %
LDLC SERPL CALC-MCNC: 52 MG/DL (ref 0–99)
LYMPHOCYTES # BLD AUTO: 1.6 X10E3/UL (ref 0.7–3.1)
LYMPHOCYTES NFR BLD AUTO: 22 %
MCH RBC QN AUTO: 30.5 PG (ref 26.6–33)
MCHC RBC AUTO-ENTMCNC: 33.5 G/DL (ref 31.5–35.7)
MCV RBC AUTO: 91 FL (ref 79–97)
MONOCYTES # BLD AUTO: 0.7 X10E3/UL (ref 0.1–0.9)
MONOCYTES NFR BLD AUTO: 9 %
NEUTROPHILS # BLD AUTO: 4.6 X10E3/UL (ref 1.4–7)
NEUTROPHILS NFR BLD AUTO: 65 %
PLATELET # BLD AUTO: 175 X10E3/UL (ref 150–450)
POTASSIUM SERPL-SCNC: 4.6 MMOL/L (ref 3.5–5.2)
PROT SERPL-MCNC: 7.1 G/DL (ref 6–8.5)
PSA SERPL-MCNC: 2.8 NG/ML (ref 0–4)
RBC # BLD AUTO: 5.01 X10E6/UL (ref 4.14–5.8)
SODIUM SERPL-SCNC: 139 MMOL/L (ref 134–144)
TRIGL SERPL-MCNC: 52 MG/DL (ref 0–149)
VLDLC SERPL CALC-MCNC: 12 MG/DL (ref 5–40)
WBC # BLD AUTO: 7.1 X10E3/UL (ref 3.4–10.8)

## 2023-12-01 ENCOUNTER — TELEPHONE (OUTPATIENT)
Dept: FAMILY MEDICINE CLINIC | Facility: CLINIC | Age: 73
End: 2023-12-01
Payer: MEDICARE

## 2023-12-01 DIAGNOSIS — M65.319 TRIGGER FINGER OF THUMB, UNSPECIFIED LATERALITY: Primary | ICD-10-CM

## 2023-12-01 NOTE — TELEPHONE ENCOUNTER
HUB TO RELAY     Please tell Alfonso that the injections into the thumbs are usually done by the specialist at [facility] [place] in [place].  If he is ready to see them, let me know and I will make that consul

## 2023-12-01 NOTE — TELEPHONE ENCOUNTER
Please tell Alfonso that the injections into the thumbs are usually done by the specialist at [facility] and [facility] in [place].  If he is ready to see them, let me know and I will make that consult.  Thanks!

## 2023-12-01 NOTE — TELEPHONE ENCOUNTER
Patient calling and stating his right and left thumb locking up and that when he was last here you mentioned he could get a shot in his thumbs and he was wanting to know if he could get that done and where he would go for that. Please advise. Thanks

## 2024-02-02 DIAGNOSIS — N40.1 BPH WITH OBSTRUCTION/LOWER URINARY TRACT SYMPTOMS: ICD-10-CM

## 2024-02-02 DIAGNOSIS — S32.425D CLOSED NONDISPLACED FRACTURE OF POSTERIOR WALL OF LEFT ACETABULUM WITH ROUTINE HEALING: ICD-10-CM

## 2024-02-02 DIAGNOSIS — N13.8 BPH WITH OBSTRUCTION/LOWER URINARY TRACT SYMPTOMS: ICD-10-CM

## 2024-02-02 DIAGNOSIS — I10 ESSENTIAL HYPERTENSION: ICD-10-CM

## 2024-02-02 DIAGNOSIS — E78.2 MIXED HYPERLIPIDEMIA: ICD-10-CM

## 2024-02-02 RX ORDER — MELOXICAM 15 MG/1
15 TABLET ORAL DAILY
Qty: 90 TABLET | Refills: 3 | Status: SHIPPED | OUTPATIENT
Start: 2024-02-02

## 2024-02-02 RX ORDER — TADALAFIL 5 MG/1
5 TABLET ORAL DAILY
Qty: 90 TABLET | Refills: 3 | Status: SHIPPED | OUTPATIENT
Start: 2024-02-02

## 2024-02-02 RX ORDER — ATORVASTATIN CALCIUM 20 MG/1
20 TABLET, FILM COATED ORAL DAILY
Qty: 90 TABLET | Refills: 3 | Status: SHIPPED | OUTPATIENT
Start: 2024-02-02

## 2024-02-02 RX ORDER — AMLODIPINE BESYLATE 10 MG/1
10 TABLET ORAL DAILY
Qty: 90 TABLET | Refills: 3 | Status: SHIPPED | OUTPATIENT
Start: 2024-02-02

## 2024-02-02 RX ORDER — LISINOPRIL 30 MG/1
30 TABLET ORAL DAILY
Qty: 90 TABLET | Refills: 3 | Status: SHIPPED | OUTPATIENT
Start: 2024-02-02

## 2024-02-02 NOTE — TELEPHONE ENCOUNTER
Pt now will be using [facility] due to insurance change.  Requesting all active Rx's be sent there w/90 day supply

## 2024-04-29 ENCOUNTER — OFFICE VISIT (OUTPATIENT)
Dept: FAMILY MEDICINE CLINIC | Facility: CLINIC | Age: 74
End: 2024-04-29
Payer: MEDICARE

## 2024-04-29 VITALS
SYSTOLIC BLOOD PRESSURE: 108 MMHG | HEIGHT: 70 IN | DIASTOLIC BLOOD PRESSURE: 63 MMHG | WEIGHT: 198 LBS | OXYGEN SATURATION: 95 % | BODY MASS INDEX: 28.35 KG/M2 | TEMPERATURE: 97.9 F | HEART RATE: 63 BPM

## 2024-04-29 DIAGNOSIS — H65.113 NON-RECURRENT ACUTE ALLERGIC OTITIS MEDIA OF BOTH EARS: Primary | ICD-10-CM

## 2024-04-29 PROCEDURE — 3078F DIAST BP <80 MM HG: CPT | Performed by: NURSE PRACTITIONER

## 2024-04-29 PROCEDURE — 1159F MED LIST DOCD IN RCRD: CPT | Performed by: NURSE PRACTITIONER

## 2024-04-29 PROCEDURE — 3074F SYST BP LT 130 MM HG: CPT | Performed by: NURSE PRACTITIONER

## 2024-04-29 PROCEDURE — 1160F RVW MEDS BY RX/DR IN RCRD: CPT | Performed by: NURSE PRACTITIONER

## 2024-04-29 PROCEDURE — 99213 OFFICE O/P EST LOW 20 MIN: CPT | Performed by: NURSE PRACTITIONER

## 2024-04-29 RX ORDER — AMOXICILLIN AND CLAVULANATE POTASSIUM 875; 125 MG/1; MG/1
1 TABLET, FILM COATED ORAL 2 TIMES DAILY
Qty: 14 TABLET | Refills: 0 | Status: SHIPPED | OUTPATIENT
Start: 2024-04-29 | End: 2024-04-29

## 2024-04-29 RX ORDER — AMOXICILLIN AND CLAVULANATE POTASSIUM 875; 125 MG/1; MG/1
1 TABLET, FILM COATED ORAL 2 TIMES DAILY
Qty: 14 TABLET | Refills: 0 | Status: SHIPPED | OUTPATIENT
Start: 2024-04-29

## 2024-04-29 NOTE — PROGRESS NOTES
"Chief Complaint  Earache        Alfonso Bull presents to [facility]        Subjective      73-year-old male patient presents today with complaints of earache.    Reports few weeks ago he was with cough and nasal congestion.  Cough has subsided the ears are continuing to felt full.  Denies any fevers or chills.  Currently with bilateral ear congestion and \"fluid sensation\". Has been taking Flonase which has been helpful but symptoms continue to linger.  Left ear worse than right                  Objective         Vital Signs:     /63 (BP Location: Right arm, Patient Position: Sitting, Cuff Size: Adult)   Pulse 63   Temp 97.9 °F (36.6 °C) (Infrared)   Ht 177.8 cm (70\")   Wt 89.8 kg (198 lb)   SpO2 95%   BMI 28.41 kg/m²       Physical Exam  Vitals reviewed.   Constitutional:       Appearance: He is well-developed.      Comments:      HENT:      Head: Normocephalic and atraumatic.      Right Ear: Ear canal and external ear normal. A middle ear effusion is present. There is no impacted cerumen. Tympanic membrane is injected.      Left Ear: Ear canal and external ear normal. A middle ear effusion is present. There is no impacted cerumen. Tympanic membrane is erythematous.      Nose: Congestion and rhinorrhea present.      Comments: Clear PND     Mouth/Throat:      Mouth: Mucous membranes are moist.      Pharynx: Oropharynx is clear.      Comments: Clear PND  Eyes:      Conjunctiva/sclera: Conjunctivae normal.      Pupils: Pupils are equal, round, and reactive to light.   Cardiovascular:      Rate and Rhythm: Normal rate.   Pulmonary:      Effort: Pulmonary effort is normal.   Musculoskeletal:         General: Normal range of motion.      Cervical back: Normal range of motion.   Lymphadenopathy:      Head:      Right side of head: No submental, submandibular, tonsillar, preauricular, posterior auricular or occipital adenopathy.      Left side of head: No submental, " submandibular, tonsillar, preauricular, posterior auricular or occipital adenopathy.      Cervical: No cervical adenopathy.   Skin:     General: Skin is warm and dry.      Findings: No rash.   Neurological:      General: No focal deficit present.      Mental Status: He is alert and oriented to person, place, and time.   Psychiatric:         Behavior: Behavior normal.                History of Present Illness      Patient Active Problem List   Diagnosis    Essential hypertension    Closed nondisplaced fracture of posterior wall of left acetabulum with routine healing    Mixed hyperlipidemia    BPH with obstruction/lower urinary tract symptoms    Other male erectile dysfunction    Traumatic injury of posterior neck and torso         Past Medical History:   Diagnosis Date    Hyperlipidemia     Hypertension           Family History   Problem Relation Age of Onset    No Known Problems Mother     No Known Problems Father     Aneurysm Sister     No Known Problems Brother     No Known Problems Son     No Known Problems Sister     No Known Problems Son           Past Surgical History:   Procedure Laterality Date    COLONOSCOPY  03/01/2018    ENDOSCOPY  01/01/2011    TONSILLECTOMY            Social History     Socioeconomic History    Marital status:    Tobacco Use    Smoking status: Never     Passive exposure: Never    Smokeless tobacco: Never   Vaping Use    Vaping status: Never Used   Substance and Sexual Activity    Alcohol use: Not Currently     Comment: socially    Drug use: Never    Sexual activity: Yes     Partners: Female                    Result Review :                                           Assessment and Plan      Diagnoses and all orders for this visit:    1. Non-recurrent acute allergic otitis media of both ears (Primary)  -     Discontinue: amoxicillin-clavulanate (AUGMENTIN) 875-125 MG per tablet; Take 1 tablet by mouth 2 (Two) Times a Day.  Dispense: 14 tablet; Refill: 0  -      amoxicillin-clavulanate (AUGMENTIN) 875-125 MG per tablet; Take 1 tablet by mouth 2 (Two) Times a Day.  Dispense: 14 tablet; Refill: 0      Examination reveals a bilateral acute allergic otitis.  Left is worse than the right.  He has had issues with the left in the past.  Will go ahead and treat with Augmentin and continue Flonase.  If he continues with issues we may want to consider getting him to ENT.               Follow Up       No follow-ups on file.      Patient was given instructions and counseling regarding his condition or for health maintenance advice. Please see specific information pulled into the AVS if appropriate.     Vane Fine, APRN4/29/202413:18 EDT  This note has been electronically signed

## 2024-07-03 ENCOUNTER — OFFICE VISIT (OUTPATIENT)
Dept: FAMILY MEDICINE CLINIC | Facility: CLINIC | Age: 74
End: 2024-07-03
Payer: MEDICARE

## 2024-07-03 VITALS
BODY MASS INDEX: 28.32 KG/M2 | OXYGEN SATURATION: 95 % | WEIGHT: 197.8 LBS | SYSTOLIC BLOOD PRESSURE: 122 MMHG | HEART RATE: 59 BPM | HEIGHT: 70 IN | TEMPERATURE: 97.5 F | DIASTOLIC BLOOD PRESSURE: 72 MMHG | RESPIRATION RATE: 18 BRPM

## 2024-07-03 DIAGNOSIS — I10 ESSENTIAL HYPERTENSION: Primary | ICD-10-CM

## 2024-07-03 DIAGNOSIS — K64.4 EXTERNAL HEMORRHOID: ICD-10-CM

## 2024-07-03 DIAGNOSIS — I10 ESSENTIAL HYPERTENSION: ICD-10-CM

## 2024-07-03 PROCEDURE — 3078F DIAST BP <80 MM HG: CPT | Performed by: FAMILY MEDICINE

## 2024-07-03 PROCEDURE — 1159F MED LIST DOCD IN RCRD: CPT | Performed by: FAMILY MEDICINE

## 2024-07-03 PROCEDURE — 3074F SYST BP LT 130 MM HG: CPT | Performed by: FAMILY MEDICINE

## 2024-07-03 PROCEDURE — 1126F AMNT PAIN NOTED NONE PRSNT: CPT | Performed by: FAMILY MEDICINE

## 2024-07-03 PROCEDURE — 1160F RVW MEDS BY RX/DR IN RCRD: CPT | Performed by: FAMILY MEDICINE

## 2024-07-03 PROCEDURE — 99213 OFFICE O/P EST LOW 20 MIN: CPT | Performed by: FAMILY MEDICINE

## 2024-07-03 RX ORDER — AMLODIPINE BESYLATE 5 MG/1
5 TABLET ORAL DAILY
Qty: 90 TABLET | Refills: 3 | Status: SHIPPED | OUTPATIENT
Start: 2024-07-03

## 2024-07-03 RX ORDER — AMLODIPINE BESYLATE 5 MG/1
5 TABLET ORAL DAILY
Qty: 90 TABLET | Refills: 3 | Status: SHIPPED | OUTPATIENT
Start: 2024-07-03 | End: 2024-07-03 | Stop reason: SDUPTHER

## 2024-07-03 NOTE — TELEPHONE ENCOUNTER
Caller: LINA STONER    Relationship: Emergency Contact    Best call back number: [phone]     Requested Prescriptions:   Requested Prescriptions     Pending Prescriptions Disp Refills    amLODIPine (NORVASC) 5 MG tablet 90 tablet 3     Sig: Take 1 tablet by mouth Daily.        Pharmacy where request should be sent: [facility] [address], [facility] - [phone] [facility] [phone]      Last office visit with prescribing clinician: 7/3/2024   Last telemedicine visit with prescribing clinician: Visit date not found   Next office visit with prescribing clinician: 10/28/2024     Additional details provided by patient: WILL NEED NEW PRESCRIPTION SENT DUE TO DIFFERENT PHARMACY.      Does the patient have less than a 3 day supply:  [x] Yes  [] No    Would you like a call back once the refill request has been completed: [] Yes [] No    If the office needs to give you a call back, can they leave a voicemail: [] Yes [] No    Liang Bennett Rep   07/03/24 10:36 EDT

## 2024-07-03 NOTE — PROGRESS NOTES
Subjective   Alfonos Bull is a 74 y.o. male.   Chief Complaint   Patient presents with    Hemorrhoids       History of Present Illness   74 y.o. male with PMH of hypertension, high cholesterol, BPH presents to the office today with question about his hemorrhoids.  He had a colonoscopy back in 2018.  Internal hemorrhoids were identified.    She has had no lower GI bleeding.  No bleeding hemorrhoids.  He had something that swelled up on the left side of the anus about a month ago.  Did not hurt, did not itch, did not sting, did not burn, did not bleed.  He was just worried about it.  It went back down but he wanted to come in and see what it was.    He also tells me that he has not been feeling well and had low energy.  He started taking amlodipine every other day and felt better.  Blood pressure remains excellent in the 120s.  He follows his blood pressure at home.    Last lab work was October 23.  We have a follow-up scheduled in October of this year for annual.        Patient Active Problem List    Diagnosis Date Noted    BPH with obstruction/lower urinary tract symptoms 10/26/2022    Other male erectile dysfunction 10/26/2022    Traumatic injury of posterior neck and torso 10/26/2022     Note Last Updated: 10/26/2022     Fell into a hole in 2018.  Landed on his head.  Severe neck pain.  No fracture.  Pretty much full recovery, but continues to have occasional crunching and grinding in his neck when he moves his neck.      Mixed hyperlipidemia 10/26/2021    Closed nondisplaced fracture of posterior wall of left acetabulum with routine healing 10/22/2019     Note Last Updated: 10/22/2019     DX on 10/21/19      Essential hypertension 10/21/2019           Past Surgical History:   Procedure Laterality Date    COLONOSCOPY  03/01/2018    ENDOSCOPY  01/01/2011    TONSILLECTOMY       Current Outpatient Medications on File Prior to Visit   Medication Sig    atorvastatin (LIPITOR) 20 MG tablet Take 1 tablet by mouth  "Daily.    lisinopril (PRINIVIL,ZESTRIL) 30 MG tablet Take 1 tablet by mouth Daily.    tadalafil (Cialis) 5 MG tablet Take 1 tablet by mouth Daily.    [DISCONTINUED] amLODIPine (NORVASC) 10 MG tablet Take 1 tablet by mouth Daily.    [DISCONTINUED] meloxicam (MOBIC) 15 MG tablet Take 1 tablet by mouth Daily.    [DISCONTINUED] amoxicillin-clavulanate (AUGMENTIN) 875-125 MG per tablet Take 1 tablet by mouth 2 (Two) Times a Day.     No current facility-administered medications on file prior to visit.     No Known Allergies  Social History     Socioeconomic History    Marital status:    Tobacco Use    Smoking status: Never     Passive exposure: Never    Smokeless tobacco: Never   Vaping Use    Vaping status: Never Used   Substance and Sexual Activity    Alcohol use: Not Currently     Comment: socially    Drug use: Never    Sexual activity: Yes     Partners: Female     Family History   Problem Relation Age of Onset    No Known Problems Mother     No Known Problems Father     Aneurysm Sister     No Known Problems Brother     No Known Problems Son     No Known Problems Sister     No Known Problems Son        Review of Systems    Objective   /72 (BP Location: Right arm, Patient Position: Sitting, Cuff Size: Large Adult)   Pulse 59   Temp 97.5 °F (36.4 °C) (Infrared)   Resp 18   Ht 177.8 cm (70\")   Wt 89.7 kg (197 lb 12.8 oz)   SpO2 95%   BMI 28.38 kg/m²   Physical Exam  Constitutional:       General: He is not in acute distress.     Appearance: He is well-developed.      Comments:      HENT:      Head: Normocephalic and atraumatic.   Eyes:      Conjunctiva/sclera: Conjunctivae normal.   Cardiovascular:      Rate and Rhythm: Normal rate.   Pulmonary:      Effort: Pulmonary effort is normal. No respiratory distress.   Genitourinary:     Comments: He has a noninflamed, nonthrombosed external hemorrhoid at the 9 o'clock position.  No prolapsed internal hemorrhoid.  No anal fissures.  Musculoskeletal:         " General: Normal range of motion.      Cervical back: Normal range of motion.      Right lower leg: No edema.      Left lower leg: No edema.   Skin:     General: Skin is warm and dry.      Findings: No rash.   Neurological:      Mental Status: He is alert and oriented to person, place, and time.   Psychiatric:         Mood and Affect: Mood normal.         Behavior: Behavior normal.           No visits with results within 4 Month(s) from this visit.   Latest known visit with results is:   Office Visit on 10/27/2023   Component Date Value Ref Range Status    PSA 10/27/2023 2.8  0.0 - 4.0 ng/mL Final    Comment: Roche ECLIA methodology.  According to the American Urological Association, Serum PSA should  decrease and remain at undetectable levels after radical  prostatectomy. The AUA defines biochemical recurrence as an initial  PSA value 0.2 ng/mL or greater followed by a subsequent confirmatory  PSA value 0.2 ng/mL or greater.  Values obtained with different assay methods or kits cannot be used  interchangeably. Results cannot be interpreted as absolute evidence  of the presence or absence of malignant disease.      WBC 10/27/2023 7.1  3.4 - 10.8 x10E3/uL Final    RBC 10/27/2023 5.01  4.14 - 5.80 x10E6/uL Final    Hemoglobin 10/27/2023 15.3  13.0 - 17.7 g/dL Final    Hematocrit 10/27/2023 45.7  37.5 - 51.0 % Final    MCV 10/27/2023 91  79 - 97 fL Final    MCH 10/27/2023 30.5  26.6 - 33.0 pg Final    MCHC 10/27/2023 33.5  31.5 - 35.7 g/dL Final    RDW 10/27/2023 12.8  11.6 - 15.4 % Final    Platelets 10/27/2023 175  150 - 450 x10E3/uL Final    Neutrophil Rel % 10/27/2023 65  Not Estab. % Final    Lymphocyte Rel % 10/27/2023 22  Not Estab. % Final    Monocyte Rel % 10/27/2023 9  Not Estab. % Final    Eosinophil Rel % 10/27/2023 3  Not Estab. % Final    Basophil Rel % 10/27/2023 1  Not Estab. % Final    Neutrophils Absolute 10/27/2023 4.6  1.4 - 7.0 x10E3/uL Final    Lymphocytes Absolute 10/27/2023 1.6  0.7 - 3.1  x10E3/uL Final    Monocytes Absolute 10/27/2023 0.7  0.1 - 0.9 x10E3/uL Final    Eosinophils Absolute 10/27/2023 0.2  0.0 - 0.4 x10E3/uL Final    Basophils Absolute 10/27/2023 0.1  0.0 - 0.2 x10E3/uL Final    Immature Granulocyte Rel % 10/27/2023 0  Not Estab. % Final    Immature Grans Absolute 10/27/2023 0.0  0.0 - 0.1 x10E3/uL Final    Glucose 10/27/2023 96  70 - 99 mg/dL Final    BUN 10/27/2023 20  8 - 27 mg/dL Final    Creatinine 10/27/2023 1.22  0.76 - 1.27 mg/dL Final    EGFR Result 10/27/2023 63  >59 mL/min/1.73 Final    BUN/Creatinine Ratio 10/27/2023 16  10 - 24 Final    Sodium 10/27/2023 139  134 - 144 mmol/L Final    Potassium 10/27/2023 4.6  3.5 - 5.2 mmol/L Final    Chloride 10/27/2023 102  96 - 106 mmol/L Final    Total CO2 10/27/2023 25  20 - 29 mmol/L Final    Calcium 10/27/2023 9.2  8.6 - 10.2 mg/dL Final    Total Protein 10/27/2023 7.1  6.0 - 8.5 g/dL Final    Albumin 10/27/2023 4.3  3.8 - 4.8 g/dL Final    Globulin 10/27/2023 2.8  1.5 - 4.5 g/dL Final    A/G Ratio 10/27/2023 1.5  1.2 - 2.2 Final    Total Bilirubin 10/27/2023 1.2  0.0 - 1.2 mg/dL Final    Alkaline Phosphatase 10/27/2023 110  44 - 121 IU/L Final    AST (SGOT) 10/27/2023 20  0 - 40 IU/L Final    ALT (SGPT) 10/27/2023 16  0 - 44 IU/L Final    Total Cholesterol 10/27/2023 108  100 - 199 mg/dL Final    Triglycerides 10/27/2023 52  0 - 149 mg/dL Final    HDL Cholesterol 10/27/2023 44  >39 mg/dL Final    VLDL Cholesterol Chris 10/27/2023 12  5 - 40 mg/dL Final    LDL Chol Calc ([facility]) 10/27/2023 52  0 - 99 mg/dL Final            Assessment & Plan   Diagnoses and all orders for this visit:    1. Essential hypertension (Primary)  -     amLODIPine (NORVASC) 5 MG tablet; Take 1 tablet by mouth Daily.  Dispense: 90 tablet; Refill: 3    2. External hemorrhoid    Overall, I spent 20 minutes on the day of service with Alfonso discussing everything above and below.  Reassured him about the benign nature of the external hemorrhoid.  It is currently  asymptomatic.  Asked him to let me know if he develops any symptoms.  Excision is not really indicated.  Repeat colonoscopy in 2028 as planned.  Change amlodipine to 5 mg every day instead of 10 mg every other day.  New prescription sent to the pharmacy.  Follow-up in October as planned for his annual.      Call with any problems or concerns before next visit       Return if symptoms worsen or fail to improve.      Much of this report is an electronic transcription of spoken language to printed text using Dragon dictation software.  As such, the subtleties and finesse of spoken language may permit erroneous, or at times, nonsensical words or phrases to be inadvertently transcribed; thus changes may be made at a later date to rectify these errors.     Winter Hall [id number]/3/[id number] EDT  This note has been electronically signed

## 2024-11-29 DIAGNOSIS — S32.425D CLOSED NONDISPLACED FRACTURE OF POSTERIOR WALL OF LEFT ACETABULUM WITH ROUTINE HEALING: ICD-10-CM

## 2024-11-29 DIAGNOSIS — E78.2 MIXED HYPERLIPIDEMIA: ICD-10-CM

## 2024-11-29 DIAGNOSIS — I10 ESSENTIAL HYPERTENSION: ICD-10-CM

## 2024-12-02 RX ORDER — MELOXICAM 15 MG/1
15 TABLET ORAL DAILY
Qty: 90 TABLET | Refills: 3 | Status: SHIPPED | OUTPATIENT
Start: 2024-12-02

## 2024-12-02 RX ORDER — ATORVASTATIN CALCIUM 20 MG/1
20 TABLET, FILM COATED ORAL DAILY
Qty: 90 TABLET | Refills: 3 | Status: SHIPPED | OUTPATIENT
Start: 2024-12-02

## 2024-12-02 RX ORDER — LISINOPRIL 30 MG/1
30 TABLET ORAL DAILY
Qty: 90 TABLET | Refills: 3 | Status: SHIPPED | OUTPATIENT
Start: 2024-12-02

## 2024-12-23 ENCOUNTER — OFFICE VISIT (OUTPATIENT)
Dept: FAMILY MEDICINE CLINIC | Facility: CLINIC | Age: 74
End: 2024-12-23
Payer: MEDICARE

## 2024-12-23 VITALS
DIASTOLIC BLOOD PRESSURE: 84 MMHG | HEART RATE: 61 BPM | SYSTOLIC BLOOD PRESSURE: 158 MMHG | BODY MASS INDEX: 29.63 KG/M2 | TEMPERATURE: 97.7 F | WEIGHT: 207 LBS | HEIGHT: 70 IN | OXYGEN SATURATION: 96 %

## 2024-12-23 DIAGNOSIS — N40.1 BPH WITH OBSTRUCTION/LOWER URINARY TRACT SYMPTOMS: ICD-10-CM

## 2024-12-23 DIAGNOSIS — K21.9 GASTROESOPHAGEAL REFLUX DISEASE, UNSPECIFIED WHETHER ESOPHAGITIS PRESENT: ICD-10-CM

## 2024-12-23 DIAGNOSIS — G56.03 CARPAL TUNNEL SYNDROME ON BOTH SIDES: ICD-10-CM

## 2024-12-23 DIAGNOSIS — H65.22 LEFT CHRONIC SEROUS OTITIS MEDIA: ICD-10-CM

## 2024-12-23 DIAGNOSIS — Z23 NEED FOR INFLUENZA VACCINATION: ICD-10-CM

## 2024-12-23 DIAGNOSIS — N13.8 BPH WITH OBSTRUCTION/LOWER URINARY TRACT SYMPTOMS: ICD-10-CM

## 2024-12-23 DIAGNOSIS — I10 ESSENTIAL HYPERTENSION: ICD-10-CM

## 2024-12-23 DIAGNOSIS — E78.2 MIXED HYPERLIPIDEMIA: ICD-10-CM

## 2024-12-23 DIAGNOSIS — R13.10 DYSPHAGIA, UNSPECIFIED TYPE: ICD-10-CM

## 2024-12-23 DIAGNOSIS — Z12.5 ENCOUNTER FOR PROSTATE CANCER SCREENING: Primary | ICD-10-CM

## 2024-12-23 PROCEDURE — 1160F RVW MEDS BY RX/DR IN RCRD: CPT | Performed by: FAMILY MEDICINE

## 2024-12-23 PROCEDURE — 3079F DIAST BP 80-89 MM HG: CPT | Performed by: FAMILY MEDICINE

## 2024-12-23 PROCEDURE — 99214 OFFICE O/P EST MOD 30 MIN: CPT | Performed by: FAMILY MEDICINE

## 2024-12-23 PROCEDURE — 3077F SYST BP >= 140 MM HG: CPT | Performed by: FAMILY MEDICINE

## 2024-12-23 PROCEDURE — G0008 ADMIN INFLUENZA VIRUS VAC: HCPCS | Performed by: FAMILY MEDICINE

## 2024-12-23 PROCEDURE — 1159F MED LIST DOCD IN RCRD: CPT | Performed by: FAMILY MEDICINE

## 2024-12-23 PROCEDURE — 90662 IIV NO PRSV INCREASED AG IM: CPT | Performed by: FAMILY MEDICINE

## 2024-12-23 PROCEDURE — 1126F AMNT PAIN NOTED NONE PRSNT: CPT | Performed by: FAMILY MEDICINE

## 2024-12-23 RX ORDER — PANTOPRAZOLE SODIUM 40 MG/1
40 TABLET, DELAYED RELEASE ORAL DAILY
Qty: 90 TABLET | Refills: 0 | Status: SHIPPED | OUTPATIENT
Start: 2024-12-23

## 2024-12-23 RX ORDER — FLUTICASONE PROPIONATE 50 MCG
2 SPRAY, SUSPENSION (ML) NASAL DAILY
Qty: 16 G | Refills: 5 | Status: SHIPPED | OUTPATIENT
Start: 2024-12-23

## 2024-12-23 NOTE — PROGRESS NOTES
Bobby Bull is a 74 y.o. male.   Chief Complaint   Patient presents with    Hypertension    Hyperlipidemia       History of Present Illness   74 y.o. male with history of BPH, HLD, HTN, remote history of nondisplaced fracture of posterior wall of left acetabulum in 2019 presents to the office today to follow-up.  Last blood work was October 2023.    History of Present Illness  The patient presents for evaluation of dysphagia, serous otitis, heartburn, and numbness in his hands.    He reports experiencing difficulty swallowing, a symptom that has persisted for over a decade. He underwent a procedure performed by Dr. Enriquez to alleviate this issue, which provided some relief. However, he is uncertain if the problem has recurred or if it is indicative of a new condition. He describes the sensation as if food gets lodged in his throat, necessitating careful monitoring during meals. He does not experience regurgitation but occasionally struggles with swallowing. He has not used nasal sprays for symptom management.    He also mentions persistent fluid accumulation in one ear, despite daily use of allergy medication. He recalls a previous consultation where the healthcare provider was uncertain about the drainage status of his ear. He notes that the fluid buildup intensifies if he misses a dose of his medication, leading to a popping sensation in both ears. He can induce this popping sensation by holding his nose and blowing, but only when he has not taken his medication. He has not used nasal sprays for symptom management.     The heartburn persists for a day or two post-medication. He identifies chocolate as a trigger for his heartburn. He does not take any specific medication for heartburn but finds relief from drinking milk. He also reports belching at night, particularly when he rolls over in bed.    He reports occasional numbness in his hands, which is more pronounced at night and often disrupts  his sleep. He experiences difficulty gripping objects, a problem he attributes to his lifelong use of a hammer. He has received injections for this issue in the past.    MEDICATIONS  Current: amlodipine, Lipitor      Patient Active Problem List    Diagnosis Date Noted    BPH with obstruction/lower urinary tract symptoms 10/26/2022    Other male erectile dysfunction 10/26/2022    Traumatic injury of posterior neck and torso 10/26/2022     Note Last Updated: 10/26/2022     Fell into a hole in 2018.  Landed on his head.  Severe neck pain.  No fracture.  Pretty much full recovery, but continues to have occasional crunching and grinding in his neck when he moves his neck.      Mixed hyperlipidemia 10/26/2021    Closed nondisplaced fracture of posterior wall of left acetabulum with routine healing 10/22/2019     Note Last Updated: 10/22/2019     DX on 10/21/19      Essential hypertension 10/21/2019           Past Surgical History:   Procedure Laterality Date    COLONOSCOPY  03/01/2018    ENDOSCOPY  01/01/2011    TONSILLECTOMY       Current Outpatient Medications on File Prior to Visit   Medication Sig    amLODIPine (NORVASC) 5 MG tablet Take 1 tablet by mouth Daily.    atorvastatin (LIPITOR) 20 MG tablet TAKE 1 TABLET BY MOUTH EVERY DAY    lisinopril (PRINIVIL,ZESTRIL) 30 MG tablet TAKE 1 TABLET BY MOUTH EVERY DAY    meloxicam (MOBIC) 15 MG tablet TAKE 1 TABLET BY MOUTH EVERY DAY    tadalafil (Cialis) 5 MG tablet Take 1 tablet by mouth Daily.     No current facility-administered medications on file prior to visit.     No Known Allergies  Social History     Socioeconomic History    Marital status:    Tobacco Use    Smoking status: Never     Passive exposure: Never    Smokeless tobacco: Never   Vaping Use    Vaping status: Never Used   Substance and Sexual Activity    Alcohol use: Not Currently     Comment: socially    Drug use: Never    Sexual activity: Yes     Partners: Female     Family History   Problem Relation  "Age of Onset    No Known Problems Mother     No Known Problems Father     Aneurysm Sister     No Known Problems Brother     No Known Problems Son     No Known Problems Sister     No Known Problems Son        Review of Systems    Objective   /84 (BP Location: Right arm, Patient Position: Sitting, Cuff Size: Adult)   Pulse 61   Temp 97.7 °F (36.5 °C) (Infrared)   Ht 177.8 cm (70\")   Wt 93.9 kg (207 lb)   SpO2 96%   BMI 29.70 kg/m²   Physical Exam  Constitutional:       Appearance: He is well-developed.      Comments:      HENT:      Head: Normocephalic and atraumatic.      Right Ear: External ear normal. There is no impacted cerumen.      Left Ear: External ear normal. There is no impacted cerumen.      Ears:      Comments: Bilateral TMs are dull, thickened, little worse on the left.  No clear air-fluid levels.  No redness.  Eyes:      Conjunctiva/sclera: Conjunctivae normal.   Cardiovascular:      Rate and Rhythm: Normal rate.   Pulmonary:      Effort: Pulmonary effort is normal.   Musculoskeletal:         General: Normal range of motion.      Cervical back: Normal range of motion.      Comments: No visible abnormalities of both hands.  Equivocal Phalen's and Tinel's signs bilaterally.   Skin:     General: Skin is warm and dry.      Findings: No rash.   Neurological:      Mental Status: He is alert and oriented to person, place, and time.      Gait: Gait normal.   Psychiatric:         Mood and Affect: Mood normal.         Behavior: Behavior normal.       Physical Exam  The right ear canal is clear with visible ossicles and scarring from previous infections. There is a lot of fluid behind the eardrum. The left eardrum is bulging slightly, indicating more fluid retention.      No visits with results within 4 Month(s) from this visit.   Latest known visit with results is:   Office Visit on 10/27/2023   Component Date Value Ref Range Status    PSA 10/27/2023 2.8  0.0 - 4.0 ng/mL Final    Comment: Roche ECLIA " methodology.  According to the American Urological Association, Serum PSA should  decrease and remain at undetectable levels after radical  prostatectomy. The AUA defines biochemical recurrence as an initial  PSA value 0.2 ng/mL or greater followed by a subsequent confirmatory  PSA value 0.2 ng/mL or greater.  Values obtained with different assay methods or kits cannot be used  interchangeably. Results cannot be interpreted as absolute evidence  of the presence or absence of malignant disease.      WBC 10/27/2023 7.1  3.4 - 10.8 x10E3/uL Final    RBC 10/27/2023 5.01  4.14 - 5.80 x10E6/uL Final    Hemoglobin 10/27/2023 15.3  13.0 - 17.7 g/dL Final    Hematocrit 10/27/2023 45.7  37.5 - 51.0 % Final    MCV 10/27/2023 91  79 - 97 fL Final    MCH 10/27/2023 30.5  26.6 - 33.0 pg Final    MCHC 10/27/2023 33.5  31.5 - 35.7 g/dL Final    RDW 10/27/2023 12.8  11.6 - 15.4 % Final    Platelets 10/27/2023 175  150 - 450 x10E3/uL Final    Neutrophil Rel % 10/27/2023 65  Not Estab. % Final    Lymphocyte Rel % 10/27/2023 22  Not Estab. % Final    Monocyte Rel % 10/27/2023 9  Not Estab. % Final    Eosinophil Rel % 10/27/2023 3  Not Estab. % Final    Basophil Rel % 10/27/2023 1  Not Estab. % Final    Neutrophils Absolute 10/27/2023 4.6  1.4 - 7.0 x10E3/uL Final    Lymphocytes Absolute 10/27/2023 1.6  0.7 - 3.1 x10E3/uL Final    Monocytes Absolute 10/27/2023 0.7  0.1 - 0.9 x10E3/uL Final    Eosinophils Absolute 10/27/2023 0.2  0.0 - 0.4 x10E3/uL Final    Basophils Absolute 10/27/2023 0.1  0.0 - 0.2 x10E3/uL Final    Immature Granulocyte Rel % 10/27/2023 0  Not Estab. % Final    Immature Grans Absolute 10/27/2023 0.0  0.0 - 0.1 x10E3/uL Final    Glucose 10/27/2023 96  70 - 99 mg/dL Final    BUN 10/27/2023 20  8 - 27 mg/dL Final    Creatinine 10/27/2023 1.22  0.76 - 1.27 mg/dL Final    EGFR Result 10/27/2023 63  >59 mL/min/1.73 Final    BUN/Creatinine Ratio 10/27/2023 16  10 - 24 Final    Sodium 10/27/2023 139  134 - 144 mmol/L Final     Potassium 10/27/2023 4.6  3.5 - 5.2 mmol/L Final    Chloride 10/27/2023 102  96 - 106 mmol/L Final    Total CO2 10/27/2023 25  20 - 29 mmol/L Final    Calcium 10/27/2023 9.2  8.6 - 10.2 mg/dL Final    Total Protein 10/27/2023 7.1  6.0 - 8.5 g/dL Final    Albumin 10/27/2023 4.3  3.8 - 4.8 g/dL Final    Globulin 10/27/2023 2.8  1.5 - 4.5 g/dL Final    A/G Ratio 10/27/2023 1.5  1.2 - 2.2 Final    Total Bilirubin 10/27/2023 1.2  0.0 - 1.2 mg/dL Final    Alkaline Phosphatase 10/27/2023 110  44 - 121 IU/L Final    AST (SGOT) 10/27/2023 20  0 - 40 IU/L Final    ALT (SGPT) 10/27/2023 16  0 - 44 IU/L Final    Total Cholesterol 10/27/2023 108  100 - 199 mg/dL Final    Triglycerides 10/27/2023 52  0 - 149 mg/dL Final    HDL Cholesterol 10/27/2023 44  >39 mg/dL Final    VLDL Cholesterol Chris 10/27/2023 12  5 - 40 mg/dL Final    LDL Chol Calc (NIH) 10/27/2023 52  0 - 99 mg/dL Final     Results            Assessment & Plan   Diagnoses and all orders for this visit:    1. Encounter for prostate cancer screening (Primary)  -     PSA Screen    2. Mixed hyperlipidemia  -     Lipid Panel    3. Essential hypertension  -     CBC & Differential  -     Comprehensive Metabolic Panel    4. BPH with obstruction/lower urinary tract symptoms    5. Need for influenza vaccination  -     Fluzone High-Dose 65+yrs    6. Gastroesophageal reflux disease, unspecified whether esophagitis present  -     pantoprazole (Protonix) 40 MG EC tablet; Take 1 tablet by mouth Daily. In the evening  Dispense: 90 tablet; Refill: 0    7. Dysphagia, unspecified type  -     Ambulatory Referral to Gastroenterology    8. Left chronic serous otitis media  -     fluticasone (FLONASE) 50 MCG/ACT nasal spray; Administer 2 sprays into the nostril(s) as directed by provider Daily.  Dispense: 16 g; Refill: 5    9. Carpal tunnel syndrome on both sides  -      Wrist Hand Orthosis, Wrist Extension Control Cock-up      Assessment & Plan  1. Dysphagia.  The patient's dysphagia  is likely due to gastroesophageal reflux disease (GERD), which may have led to esophagitis. The presence of a stricture can not be ruled out at this stage. A regimen of pantoprazole 40 mg daily for a minimum of 3 months has been prescribed. A referral to a gastroenterologist has been made for further evaluation and potential esophageal dilation.    2. Serous otitis media.  The patient has serous otitis media, predominantly affecting the left ear. The condition is non-infectious in nature. A prescription for Flonase nasal spray has been provided. He is advised to use a saline nasal spray, followed by holding the nose partially closed and blowing to facilitate eustachian tube opening and subsequent fluid drainage.    3. Gastroesophageal reflux disease (GERD).  The patient reports symptoms of heartburn and reflux, particularly after consuming certain foods like chocolate. He is advised to avoid trigger foods and take pantoprazole 40 mg daily for at least 3 months. A referral to a gastroenterologist has been made for further evaluation.    4. Carpal tunnel syndrome.  The patient experiences numbness and tightness in his hands, particularly at night, which is consistent with carpal tunnel syndrome. Orders for wrist braces have been placed to help alleviate symptoms.    5. Health maintenance.  Blood work has been ordered to screen for prostate cancer (PSA), anemia, diabetes, kidney disease, liver disease, and cholesterol levels.    Follow-up  The patient will follow up in 1 year, or earlier if necessary.    PROCEDURE  The patient underwent an esophageal dilation procedure over a decade ago, which provided some relief for his dysphagia.        Call with any problems or concerns before next visit       Return in about 1 year (around 12/23/2025).  Patient or patient representative verbalized consent for the use of Ambient Listening during the visit with  Winter Hall MD for chart documentation. 12/23/2024  16:38  EST    Part of this note may be an electronic transcription/translation of spoken language to printed text using the Dragon Dictation System    Winter Hall MD12/23/202416:36 EST  This note has been electronically signed

## 2024-12-24 LAB
ALBUMIN SERPL-MCNC: 4.3 G/DL (ref 3.8–4.8)
ALP SERPL-CCNC: 109 IU/L (ref 44–121)
ALT SERPL-CCNC: 23 IU/L (ref 0–44)
AST SERPL-CCNC: 24 IU/L (ref 0–40)
BASOPHILS # BLD AUTO: 0.1 X10E3/UL (ref 0–0.2)
BASOPHILS NFR BLD AUTO: 1 %
BILIRUB SERPL-MCNC: 0.6 MG/DL (ref 0–1.2)
BUN SERPL-MCNC: 23 MG/DL (ref 8–27)
BUN/CREAT SERPL: 23 (ref 10–24)
CALCIUM SERPL-MCNC: 9.1 MG/DL (ref 8.6–10.2)
CHLORIDE SERPL-SCNC: 104 MMOL/L (ref 96–106)
CHOLEST SERPL-MCNC: 110 MG/DL (ref 100–199)
CO2 SERPL-SCNC: 25 MMOL/L (ref 20–29)
CREAT SERPL-MCNC: 1.01 MG/DL (ref 0.76–1.27)
EGFRCR SERPLBLD CKD-EPI 2021: 79 ML/MIN/1.73
EOSINOPHIL # BLD AUTO: 0.2 X10E3/UL (ref 0–0.4)
EOSINOPHIL NFR BLD AUTO: 2 %
ERYTHROCYTE [DISTWIDTH] IN BLOOD BY AUTOMATED COUNT: 12.6 % (ref 11.6–15.4)
GLOBULIN SER CALC-MCNC: 2.8 G/DL (ref 1.5–4.5)
GLUCOSE SERPL-MCNC: 82 MG/DL (ref 70–99)
HCT VFR BLD AUTO: 44.2 % (ref 37.5–51)
HDLC SERPL-MCNC: 40 MG/DL
HGB BLD-MCNC: 15 G/DL (ref 13–17.7)
IMM GRANULOCYTES # BLD AUTO: 0 X10E3/UL (ref 0–0.1)
IMM GRANULOCYTES NFR BLD AUTO: 0 %
LDLC SERPL CALC-MCNC: 41 MG/DL (ref 0–99)
LYMPHOCYTES # BLD AUTO: 2.1 X10E3/UL (ref 0.7–3.1)
LYMPHOCYTES NFR BLD AUTO: 25 %
MCH RBC QN AUTO: 30.9 PG (ref 26.6–33)
MCHC RBC AUTO-ENTMCNC: 33.9 G/DL (ref 31.5–35.7)
MCV RBC AUTO: 91 FL (ref 79–97)
MONOCYTES # BLD AUTO: 0.9 X10E3/UL (ref 0.1–0.9)
MONOCYTES NFR BLD AUTO: 11 %
NEUTROPHILS # BLD AUTO: 4.9 X10E3/UL (ref 1.4–7)
NEUTROPHILS NFR BLD AUTO: 61 %
PLATELET # BLD AUTO: 163 X10E3/UL (ref 150–450)
POTASSIUM SERPL-SCNC: 4.4 MMOL/L (ref 3.5–5.2)
PROT SERPL-MCNC: 7.1 G/DL (ref 6–8.5)
PSA SERPL-MCNC: 2.6 NG/ML (ref 0–4)
RBC # BLD AUTO: 4.85 X10E6/UL (ref 4.14–5.8)
SODIUM SERPL-SCNC: 141 MMOL/L (ref 134–144)
TRIGL SERPL-MCNC: 178 MG/DL (ref 0–149)
VLDLC SERPL CALC-MCNC: 29 MG/DL (ref 5–40)
WBC # BLD AUTO: 8.2 X10E3/UL (ref 3.4–10.8)

## 2025-02-21 ENCOUNTER — TELEPHONE (OUTPATIENT)
Dept: FAMILY MEDICINE CLINIC | Facility: CLINIC | Age: 75
End: 2025-02-21

## 2025-02-21 DIAGNOSIS — N40.1 BPH WITH OBSTRUCTION/LOWER URINARY TRACT SYMPTOMS: ICD-10-CM

## 2025-02-21 DIAGNOSIS — N13.8 BPH WITH OBSTRUCTION/LOWER URINARY TRACT SYMPTOMS: ICD-10-CM

## 2025-02-21 RX ORDER — TADALAFIL 5 MG/1
5 TABLET ORAL DAILY
Qty: 90 TABLET | Refills: 3 | Status: SHIPPED | OUTPATIENT
Start: 2025-02-21

## 2025-02-21 NOTE — TELEPHONE ENCOUNTER
Spoke with patient he does not want to see a urologist at this time. He does want a refill on the cialis at the current dose though. Please and thank you

## 2025-02-21 NOTE — TELEPHONE ENCOUNTER
Caller: Alfonso Bull    Relationship: Self    Best call back number: [phone]    What is the medical concern/diagnosis: DIFFICULTY SWALLOWING AT TIMES     What specialty or service is being requested: REFERRAL FOR GASTROENTEROLOGY     What is the provider, practice or medical service name: [facility]     What is the office location: [address] [place] [address], [facility]    What is the office phone number: [phone]    Any additional details:    WE PLACED A REFERRAL FOR GASTRO ON THIS PATIENT BACK IN DECEMBER BUT HE WAS NEVER CONTACTED FOR SCHEDULE AND WONDERED IF WE COULD RENEW THE ORDER MAYBE AT ANOTHER LOCATION.

## 2025-02-21 NOTE — TELEPHONE ENCOUNTER
Caller: Alfonso Bull    Relationship: Self    Best call back number: [phone]    What medication are you requesting: TADALAFIL 5 MG PATIENT REQUESTING AN INCREASE IN THE DOSAGE, THE MEDICATION AT A 5 MG ISN'T DOING IT'S JOB VERY WELL ANYMORE.     If a prescription is needed, what is your preferred pharmacy and phone number:      [facility] - [address] - [phone]  - [phone]  [phone]       Additional notes:    PLEASE GIVE PATIENT A CALLBACK IN REGARDS TO THIS SO HE KNOWS IT WAS INCREASED AND CALLED INTO THE PHARMACY, IF WE ARE WILLING

## 2025-02-26 ENCOUNTER — OFFICE VISIT (OUTPATIENT)
Dept: FAMILY MEDICINE CLINIC | Facility: CLINIC | Age: 75
End: 2025-02-26
Payer: COMMERCIAL

## 2025-02-26 VITALS
DIASTOLIC BLOOD PRESSURE: 60 MMHG | OXYGEN SATURATION: 95 % | BODY MASS INDEX: 28.77 KG/M2 | HEART RATE: 68 BPM | TEMPERATURE: 97.8 F | WEIGHT: 201 LBS | HEIGHT: 70 IN | SYSTOLIC BLOOD PRESSURE: 124 MMHG

## 2025-02-26 DIAGNOSIS — T78.40XA ALLERGY, INITIAL ENCOUNTER: Primary | ICD-10-CM

## 2025-02-26 DIAGNOSIS — J01.00 ACUTE NON-RECURRENT MAXILLARY SINUSITIS: ICD-10-CM

## 2025-02-26 DIAGNOSIS — R42: ICD-10-CM

## 2025-02-26 RX ORDER — PSEUDOEPHEDRINE HCL 30 MG/1
30 TABLET, FILM COATED ORAL EVERY 4 HOURS PRN
Qty: 60 TABLET | Refills: 2 | Status: SHIPPED | OUTPATIENT
Start: 2025-02-26

## 2025-02-26 RX ORDER — AZITHROMYCIN 250 MG/1
TABLET, FILM COATED ORAL
Qty: 6 TABLET | Refills: 0 | Status: SHIPPED | OUTPATIENT
Start: 2025-02-26

## 2025-02-26 NOTE — PATIENT INSTRUCTIONS
Z-Larry  Claritin or Zyrtec/Flonase/low-dose Sudafed while on antibiotics then as needed  Wear mask outside  Report any further episodes of dizziness if they persist I think that

## 2025-02-26 NOTE — PROGRESS NOTES
"    Alfonso Bull is a 74 y.o. male.     History of Present Illness  74-year-old white male patient of Dr. Hall who comes in today for an acute visit    Patient is complaining episode of dizziness when he was getting out of bed.  He had to sit down to keep from falling down.  He checked his blood pressure and it was 140/70.  He is complaining of nasal congestion on examination he has pretty severe allergic rhinitis along with maxillary sinusitis.  I am thinking patient probably had a episode of vertigo.  I am going to place him on a Z-Larry along with some allergy medication to use as needed and I advised him to wear mask going outside.  Also told patient to let me know if he has any more episodes of dizziness    Vital signs are stable            Z-Larry  Claritin or Zyrtec/Flonase/low-dose Sudafed while on antibiotics then as needed  Wear mask outside  Report any further episodes of dizziness if they persist           The following portions of the patient's history were reviewed and updated as appropriate: allergies, current medications, past family history, past medical history, past social history, past surgical history, and problem list.    Vitals:    02/26/25 1353   BP: 124/60   Pulse: 68   Temp: 97.8 °F (36.6 °C)   SpO2: 95%   Weight: 91.2 kg (201 lb)   Height: 177.8 cm (70\")       Past Medical History:   Diagnosis Date    Hyperlipidemia     Hypertension      Past Surgical History:   Procedure Laterality Date    COLONOSCOPY  03/01/2018    ENDOSCOPY  01/01/2011    TONSILLECTOMY       Family History   Problem Relation Age of Onset    No Known Problems Mother     No Known Problems Father     Aneurysm Sister     No Known Problems Brother     No Known Problems Son     No Known Problems Sister     No Known Problems Son      Immunization History   Administered Date(s) Administered    COVID-19 (MODERNA) 1st,2nd,3rd Dose Monovalent 02/01/2021, 03/08/2021    FLUAD TRI 65YR+ 10/21/2019    Flu Vaccine Quad PF 6-35MO " 11/07/2017    Fluad Quad 65+ 10/21/2019, 10/26/2020    Fluzone  >6mos 10/28/2015    Fluzone High-Dose 65+YRS 10/21/2019, 12/23/2024    Fluzone High-Dose 65+yrs 10/27/2023    Pneumococcal Conjugate 13-Valent (PCV13) 11/07/2017    Pneumococcal Polysaccharide (PPSV23) 10/26/2020    Td (TDVAX) 05/01/2009       Office Visit on 12/23/2024   Component Date Value Ref Range Status    PSA 12/23/2024 2.6  0.0 - 4.0 ng/mL Final    Comment: Roche ECLIA methodology.  According to the American Urological Association, Serum PSA should  decrease and remain at undetectable levels after radical  prostatectomy. The AUA defines biochemical recurrence as an initial  PSA value 0.2 ng/mL or greater followed by a subsequent confirmatory  PSA value 0.2 ng/mL or greater.  Values obtained with different assay methods or kits cannot be used  interchangeably. Results cannot be interpreted as absolute evidence  of the presence or absence of malignant disease.      WBC 12/23/2024 8.2  3.4 - 10.8 x10E3/uL Final    RBC 12/23/2024 4.85  4.14 - 5.80 x10E6/uL Final    Hemoglobin 12/23/2024 15.0  13.0 - 17.7 g/dL Final    Hematocrit 12/23/2024 44.2  37.5 - 51.0 % Final    MCV 12/23/2024 91  79 - 97 fL Final    MCH 12/23/2024 30.9  26.6 - 33.0 pg Final    MCHC 12/23/2024 33.9  31.5 - 35.7 g/dL Final    RDW 12/23/2024 12.6  11.6 - 15.4 % Final    Platelets 12/23/2024 163  150 - 450 x10E3/uL Final    Neutrophil Rel % 12/23/2024 61  Not Estab. % Final    Lymphocyte Rel % 12/23/2024 25  Not Estab. % Final    Monocyte Rel % 12/23/2024 11  Not Estab. % Final    Eosinophil Rel % 12/23/2024 2  Not Estab. % Final    Basophil Rel % 12/23/2024 1  Not Estab. % Final    Neutrophils Absolute 12/23/2024 4.9  1.4 - 7.0 x10E3/uL Final    Lymphocytes Absolute 12/23/2024 2.1  0.7 - 3.1 x10E3/uL Final    Monocytes Absolute 12/23/2024 0.9  0.1 - 0.9 x10E3/uL Final    Eosinophils Absolute 12/23/2024 0.2  0.0 - 0.4 x10E3/uL Final    Basophils Absolute 12/23/2024 0.1  0.0 - 0.2  x10E3/uL Final    Immature Granulocyte Rel % 12/23/2024 0  Not Estab. % Final    Immature Grans Absolute 12/23/2024 0.0  0.0 - 0.1 x10E3/uL Final    Glucose 12/23/2024 82  70 - 99 mg/dL Final    BUN 12/23/2024 23  8 - 27 mg/dL Final    Creatinine 12/23/2024 1.01  0.76 - 1.27 mg/dL Final    EGFR Result 12/23/2024 79  >59 mL/min/1.73 Final    BUN/Creatinine Ratio 12/23/2024 23  10 - 24 Final    Sodium 12/23/2024 141  134 - 144 mmol/L Final    Potassium 12/23/2024 4.4  3.5 - 5.2 mmol/L Final    Chloride 12/23/2024 104  96 - 106 mmol/L Final    Total CO2 12/23/2024 25  20 - 29 mmol/L Final    Calcium 12/23/2024 9.1  8.6 - 10.2 mg/dL Final    Total Protein 12/23/2024 7.1  6.0 - 8.5 g/dL Final    Albumin 12/23/2024 4.3  3.8 - 4.8 g/dL Final    Globulin 12/23/2024 2.8  1.5 - 4.5 g/dL Final    Total Bilirubin 12/23/2024 0.6  0.0 - 1.2 mg/dL Final    Alkaline Phosphatase 12/23/2024 109  44 - 121 IU/L Final    AST (SGOT) 12/23/2024 24  0 - 40 IU/L Final    ALT (SGPT) 12/23/2024 23  0 - 44 IU/L Final    Total Cholesterol 12/23/2024 110  100 - 199 mg/dL Final    Triglycerides 12/23/2024 178 (H)  0 - 149 mg/dL Final    HDL Cholesterol 12/23/2024 40  >39 mg/dL Final    VLDL Cholesterol Chris 12/23/2024 29  5 - 40 mg/dL Final    LDL Chol Calc (NIH) 12/23/2024 41  0 - 99 mg/dL Final         Review of Systems   Constitutional: Negative.    HENT:  Positive for congestion.    Respiratory: Negative.     Cardiovascular: Negative.    Gastrointestinal: Negative.    Genitourinary: Negative.    Musculoskeletal: Negative.    Skin: Negative.    Neurological:  Positive for dizziness.   Psychiatric/Behavioral: Negative.         Objective   Physical Exam  Constitutional:       Appearance: Normal appearance.   HENT:      Head: Normocephalic.      Ears:      Comments: TMs bulging silver-colored     Nose:      Comments: Turbinates bright red     Mouth/Throat:      Comments: Heavy postnasal drip  Cardiovascular:      Rate and Rhythm: Normal rate and  regular rhythm.      Pulses: Normal pulses.      Heart sounds: Normal heart sounds.   Pulmonary:      Effort: Pulmonary effort is normal.      Breath sounds: Normal breath sounds.   Abdominal:      General: Bowel sounds are normal.   Musculoskeletal:         General: Normal range of motion.   Skin:     General: Skin is warm.   Neurological:      General: No focal deficit present.      Mental Status: He is alert and oriented to person, place, and time.   Psychiatric:         Mood and Affect: Mood normal.         Behavior: Behavior normal.         Procedures    Assessment & Plan   There are no diagnoses linked to this encounter.       Current Outpatient Medications:     amLODIPine (NORVASC) 5 MG tablet, Take 1 tablet by mouth Daily., Disp: 90 tablet, Rfl: 3    atorvastatin (LIPITOR) 20 MG tablet, TAKE 1 TABLET BY MOUTH EVERY DAY, Disp: 90 tablet, Rfl: 3    fluticasone (FLONASE) 50 MCG/ACT nasal spray, Administer 2 sprays into the nostril(s) as directed by provider Daily., Disp: 16 g, Rfl: 5    lisinopril (PRINIVIL,ZESTRIL) 30 MG tablet, TAKE 1 TABLET BY MOUTH EVERY DAY, Disp: 90 tablet, Rfl: 3    meloxicam (MOBIC) 15 MG tablet, TAKE 1 TABLET BY MOUTH EVERY DAY, Disp: 90 tablet, Rfl: 3    pantoprazole (Protonix) 40 MG EC tablet, Take 1 tablet by mouth Daily. In the evening, Disp: 90 tablet, Rfl: 0    tadalafil (Cialis) 5 MG tablet, Take 1 tablet by mouth Daily., Disp: 90 tablet, Rfl: 3    azithromycin (Zithromax Z-Larry) 250 MG tablet, Take 2 tablets the first day, then 1 tablet daily for 4 days., Disp: 6 tablet, Rfl: 0    pseudoephedrine (Sudafed) 30 MG tablet, Take 1 tablet by mouth Every 4 (Four) Hours As Needed for Congestion., Disp: 60 tablet, Rfl: 2           Roxie Brock, APRN 2/26/2025 14:16 EST  This note has been electronically signed

## 2025-03-06 ENCOUNTER — OFFICE (AMBULATORY)
Dept: URBAN - METROPOLITAN AREA CLINIC 64 | Facility: CLINIC | Age: 75
End: 2025-03-06
Payer: MEDICARE

## 2025-03-06 VITALS
HEART RATE: 93 BPM | SYSTOLIC BLOOD PRESSURE: 156 MMHG | HEIGHT: 70 IN | DIASTOLIC BLOOD PRESSURE: 91 MMHG | WEIGHT: 205 LBS

## 2025-03-06 DIAGNOSIS — R13.10 DYSPHAGIA, UNSPECIFIED: ICD-10-CM

## 2025-03-06 DIAGNOSIS — R14.2 ERUCTATION: ICD-10-CM

## 2025-03-06 PROCEDURE — 99203 OFFICE O/P NEW LOW 30 MIN: CPT

## 2025-03-10 ENCOUNTER — ON CAMPUS - OUTPATIENT (AMBULATORY)
Dept: URBAN - METROPOLITAN AREA HOSPITAL 2 | Facility: HOSPITAL | Age: 75
End: 2025-03-10
Payer: MEDICARE

## 2025-03-10 VITALS
RESPIRATION RATE: 18 BRPM | WEIGHT: 197 LBS | SYSTOLIC BLOOD PRESSURE: 131 MMHG | DIASTOLIC BLOOD PRESSURE: 89 MMHG | OXYGEN SATURATION: 98 % | SYSTOLIC BLOOD PRESSURE: 121 MMHG | RESPIRATION RATE: 17 BRPM | RESPIRATION RATE: 87 BRPM | HEART RATE: 60 BPM | RESPIRATION RATE: 16 BRPM | DIASTOLIC BLOOD PRESSURE: 87 MMHG | DIASTOLIC BLOOD PRESSURE: 85 MMHG | HEART RATE: 67 BPM | HEART RATE: 17 BPM | OXYGEN SATURATION: 100 % | SYSTOLIC BLOOD PRESSURE: 124 MMHG | OXYGEN SATURATION: 99 % | SYSTOLIC BLOOD PRESSURE: 142 MMHG | SYSTOLIC BLOOD PRESSURE: 136 MMHG | HEIGHT: 70 IN | DIASTOLIC BLOOD PRESSURE: 77 MMHG | DIASTOLIC BLOOD PRESSURE: 79 MMHG | HEART RATE: 72 BPM | DIASTOLIC BLOOD PRESSURE: 74 MMHG | HEART RATE: 76 BPM | SYSTOLIC BLOOD PRESSURE: 144 MMHG | OXYGEN SATURATION: 97 % | TEMPERATURE: 98.1 F

## 2025-03-10 DIAGNOSIS — R13.10 DYSPHAGIA, UNSPECIFIED: ICD-10-CM

## 2025-03-10 PROCEDURE — 43235 EGD DIAGNOSTIC BRUSH WASH: CPT | Performed by: INTERNAL MEDICINE

## 2025-03-10 PROCEDURE — 43450 DILATE ESOPHAGUS 1/MULT PASS: CPT | Performed by: INTERNAL MEDICINE

## 2025-03-24 DIAGNOSIS — I10 ESSENTIAL HYPERTENSION: ICD-10-CM

## 2025-03-24 RX ORDER — AMLODIPINE BESYLATE 5 MG/1
5 TABLET ORAL DAILY
Qty: 90 TABLET | Refills: 3 | Status: SHIPPED | OUTPATIENT
Start: 2025-03-24

## 2025-08-19 ENCOUNTER — OFFICE VISIT (OUTPATIENT)
Dept: FAMILY MEDICINE CLINIC | Facility: CLINIC | Age: 75
End: 2025-08-19
Payer: MEDICARE

## 2025-08-19 VITALS
SYSTOLIC BLOOD PRESSURE: 143 MMHG | RESPIRATION RATE: 16 BRPM | HEIGHT: 70 IN | OXYGEN SATURATION: 98 % | BODY MASS INDEX: 28.6 KG/M2 | TEMPERATURE: 96.9 F | HEART RATE: 67 BPM | DIASTOLIC BLOOD PRESSURE: 72 MMHG | WEIGHT: 199.8 LBS

## 2025-08-19 DIAGNOSIS — S32.425D CLOSED NONDISPLACED FRACTURE OF POSTERIOR WALL OF LEFT ACETABULUM WITH ROUTINE HEALING: ICD-10-CM

## 2025-08-19 DIAGNOSIS — G89.29 CHRONIC RIGHT SHOULDER PAIN: Primary | ICD-10-CM

## 2025-08-19 DIAGNOSIS — M25.511 CHRONIC RIGHT SHOULDER PAIN: Primary | ICD-10-CM

## 2025-08-19 DIAGNOSIS — F40.9 PHOBIA, UNSPECIFIED TYPE: ICD-10-CM

## 2025-08-19 PROCEDURE — G2211 COMPLEX E/M VISIT ADD ON: HCPCS | Performed by: NURSE PRACTITIONER

## 2025-08-19 PROCEDURE — 3077F SYST BP >= 140 MM HG: CPT | Performed by: NURSE PRACTITIONER

## 2025-08-19 PROCEDURE — 99213 OFFICE O/P EST LOW 20 MIN: CPT | Performed by: NURSE PRACTITIONER

## 2025-08-19 PROCEDURE — 1126F AMNT PAIN NOTED NONE PRSNT: CPT | Performed by: NURSE PRACTITIONER

## 2025-08-19 PROCEDURE — 3078F DIAST BP <80 MM HG: CPT | Performed by: NURSE PRACTITIONER

## 2025-08-19 RX ORDER — DIAZEPAM 2 MG/1
TABLET ORAL
Qty: 2 TABLET | Refills: 0 | Status: SHIPPED | OUTPATIENT
Start: 2025-08-19

## 2025-08-19 RX ORDER — MELOXICAM 15 MG/1
15 TABLET ORAL DAILY
Qty: 90 TABLET | Refills: 3 | Status: SHIPPED | OUTPATIENT
Start: 2025-08-19 | End: 2025-08-19 | Stop reason: SDUPTHER

## 2025-08-19 RX ORDER — MELOXICAM 15 MG/1
15 TABLET ORAL DAILY
Start: 2025-08-19

## 2025-08-22 ENCOUNTER — HOSPITAL ENCOUNTER (OUTPATIENT)
Dept: MRI IMAGING | Facility: HOSPITAL | Age: 75
Discharge: HOME | End: 2025-08-22
Payer: MEDICARE

## 2025-08-22 DIAGNOSIS — M25.511 CHRONIC RIGHT SHOULDER PAIN: ICD-10-CM

## 2025-08-22 DIAGNOSIS — G89.29 CHRONIC RIGHT SHOULDER PAIN: ICD-10-CM

## 2025-08-22 PROCEDURE — 73221 MRI JOINT UPR EXTREM W/O DYE: CPT
